# Patient Record
Sex: FEMALE | Race: WHITE | NOT HISPANIC OR LATINO | Employment: FULL TIME | URBAN - METROPOLITAN AREA
[De-identification: names, ages, dates, MRNs, and addresses within clinical notes are randomized per-mention and may not be internally consistent; named-entity substitution may affect disease eponyms.]

---

## 2017-12-12 ENCOUNTER — HOSPITAL ENCOUNTER (OUTPATIENT)
Dept: LAB | Facility: MEDICAL CENTER | Age: 42
End: 2017-12-12
Attending: OBSTETRICS & GYNECOLOGY
Payer: COMMERCIAL

## 2017-12-12 LAB — B-HCG SERPL-ACNC: 5473.9 MIU/ML (ref 0–5)

## 2017-12-12 PROCEDURE — 36415 COLL VENOUS BLD VENIPUNCTURE: CPT

## 2017-12-12 PROCEDURE — 84702 CHORIONIC GONADOTROPIN TEST: CPT

## 2017-12-14 ENCOUNTER — HOSPITAL ENCOUNTER (OUTPATIENT)
Dept: RADIOLOGY | Facility: MEDICAL CENTER | Age: 42
End: 2017-12-14
Attending: OBSTETRICS & GYNECOLOGY
Payer: COMMERCIAL

## 2017-12-14 ENCOUNTER — HOSPITAL ENCOUNTER (OUTPATIENT)
Dept: LAB | Facility: MEDICAL CENTER | Age: 42
End: 2017-12-14
Attending: OBSTETRICS & GYNECOLOGY
Payer: COMMERCIAL

## 2017-12-14 DIAGNOSIS — O00.109 TUBAL PREGNANCY WITHOUT INTRAUTERINE PREGNANCY, UNSPECIFIED LATERALITY: ICD-10-CM

## 2017-12-14 LAB — B-HCG SERPL-ACNC: 9008.7 MIU/ML (ref 0–5)

## 2017-12-14 PROCEDURE — 84702 CHORIONIC GONADOTROPIN TEST: CPT

## 2017-12-14 PROCEDURE — 36415 COLL VENOUS BLD VENIPUNCTURE: CPT

## 2017-12-14 PROCEDURE — 76817 TRANSVAGINAL US OBSTETRIC: CPT

## 2017-12-16 ENCOUNTER — OFFICE VISIT (OUTPATIENT)
Dept: URGENT CARE | Facility: PHYSICIAN GROUP | Age: 42
End: 2017-12-16
Payer: COMMERCIAL

## 2017-12-16 ENCOUNTER — HOSPITAL ENCOUNTER (OUTPATIENT)
Facility: MEDICAL CENTER | Age: 42
End: 2017-12-16
Attending: FAMILY MEDICINE
Payer: COMMERCIAL

## 2017-12-16 VITALS
HEART RATE: 102 BPM | BODY MASS INDEX: 42.52 KG/M2 | HEIGHT: 63 IN | TEMPERATURE: 97.7 F | OXYGEN SATURATION: 97 % | DIASTOLIC BLOOD PRESSURE: 70 MMHG | RESPIRATION RATE: 14 BRPM | SYSTOLIC BLOOD PRESSURE: 124 MMHG | WEIGHT: 240 LBS

## 2017-12-16 DIAGNOSIS — R39.15 URINARY URGENCY: ICD-10-CM

## 2017-12-16 LAB
APPEARANCE UR: ABNORMAL
BACTERIA #/AREA URNS HPF: ABNORMAL /HPF
BILIRUB UR QL STRIP.AUTO: NEGATIVE
COLOR UR: YELLOW
CULTURE IF INDICATED INDCX: YES UA CULTURE
EPI CELLS #/AREA URNS HPF: ABNORMAL /HPF
GLUCOSE UR STRIP.AUTO-MCNC: NEGATIVE MG/DL
HYALINE CASTS #/AREA URNS LPF: ABNORMAL /LPF
KETONES UR STRIP.AUTO-MCNC: NEGATIVE MG/DL
LEUKOCYTE ESTERASE UR QL STRIP.AUTO: ABNORMAL
MICRO URNS: ABNORMAL
NITRITE UR QL STRIP.AUTO: NEGATIVE
PH UR STRIP.AUTO: 5 [PH]
PROT UR QL STRIP: NEGATIVE MG/DL
RBC # URNS HPF: ABNORMAL /HPF
RBC UR QL AUTO: NEGATIVE
SP GR UR STRIP.AUTO: 1.03
UROBILINOGEN UR STRIP.AUTO-MCNC: 0.2 MG/DL
WBC #/AREA URNS HPF: ABNORMAL /HPF

## 2017-12-16 PROCEDURE — 81001 URINALYSIS AUTO W/SCOPE: CPT

## 2017-12-16 PROCEDURE — 99203 OFFICE O/P NEW LOW 30 MIN: CPT | Performed by: FAMILY MEDICINE

## 2017-12-16 PROCEDURE — 87086 URINE CULTURE/COLONY COUNT: CPT

## 2017-12-16 RX ORDER — CEFDINIR 300 MG/1
300 CAPSULE ORAL EVERY 12 HOURS
Qty: 10 CAP | Refills: 0 | Status: SHIPPED | OUTPATIENT
Start: 2017-12-16 | End: 2017-12-21

## 2017-12-16 RX ORDER — ALPRAZOLAM 0.25 MG/1
0.25 TABLET ORAL PRN
COMMUNITY
Start: 2017-10-05 | End: 2021-03-05

## 2017-12-16 RX ORDER — MINOCYCLINE HYDROCHLORIDE 100 MG/1
100 CAPSULE ORAL DAILY
COMMUNITY
Start: 2017-10-29 | End: 2020-08-12

## 2017-12-16 RX ORDER — CITALOPRAM 20 MG/1
20 TABLET ORAL DAILY
COMMUNITY
Start: 2017-11-20 | End: 2023-02-24

## 2017-12-16 RX ORDER — RANITIDINE 150 MG/1
150 TABLET ORAL 2 TIMES DAILY
COMMUNITY
Start: 2017-10-29 | End: 2020-08-12

## 2017-12-16 RX ORDER — MONTELUKAST SODIUM 4 MG/1
TABLET, CHEWABLE ORAL
COMMUNITY
Start: 2017-10-05 | End: 2018-03-02

## 2017-12-16 ASSESSMENT — ENCOUNTER SYMPTOMS
SORE THROAT: 1
PALPITATIONS: 0
CHILLS: 0
COUGH: 1
FEVER: 1
EYE DISCHARGE: 0
HEADACHES: 0
FLANK PAIN: 0
VOMITING: 0
NAUSEA: 0
DIARRHEA: 0
SPUTUM PRODUCTION: 0
MYALGIAS: 0
EYE REDNESS: 0
SINUS PAIN: 1
WHEEZING: 0
ABDOMINAL PAIN: 0

## 2017-12-16 ASSESSMENT — PAIN SCALES - GENERAL: PAINLEVEL: NO PAIN

## 2017-12-16 NOTE — PATIENT INSTRUCTIONS
Urinary Tract Infection  A urinary tract infection (UTI) can occur any place along the urinary tract. The tract includes the kidneys, ureters, bladder, and urethra. A type of germ called bacteria often causes a UTI. UTIs are often helped with antibiotic medicine.   HOME CARE   · If given, take antibiotics as told by your doctor. Finish them even if you start to feel better.  · Drink enough fluids to keep your pee (urine) clear or pale yellow.  · Avoid tea, drinks with caffeine, and bubbly (carbonated) drinks.  · Pee often. Avoid holding your pee in for a long time.  · Pee before and after having sex (intercourse).  · Wipe from front to back after you poop (bowel movement) if you are a woman. Use each tissue only once.  GET HELP RIGHT AWAY IF:   · You have back pain.  · You have lower belly (abdominal) pain.  · You have chills.  · You feel sick to your stomach (nauseous).  · You throw up (vomit).  · Your burning or discomfort with peeing does not go away.  · You have a fever.  · Your symptoms are not better in 3 days.  MAKE SURE YOU:   · Understand these instructions.  · Will watch your condition.  · Will get help right away if you are not doing well or get worse.     This information is not intended to replace advice given to you by your health care provider. Make sure you discuss any questions you have with your health care provider.     Document Released: 06/05/2009 Document Revised: 01/08/2016 Document Reviewed: 07/18/2013  [employer] Interactive Patient Education ©2016 [employer]

## 2017-12-16 NOTE — PROGRESS NOTES
Subjective:      Bela Gonzalez is a 41 y.o. female who presents with No chief complaint on file.            No chief complaint on file.      HISTORY OF PRESENT ILLNESS: Patient is a 41 y.o. female who presents today due to complaints of a sore throat for the past 4 days. Reports associated fever, pain with swallowing congestion and runny nose. Pain is currently rated 5/10. Denies associated  difficulty breathing. They have tried OTC medications at home without much improvement. She has  known ill contacts.       She also complains of bladder cramps dn increased frequncyShe has had symptoms for 2 days .Denies urgency and dysuria.  Patient denies stomachache, back pain, vaginal discharge. Patient does not have a history of recurrent UTI.  Patient does not have a history of pyelonephritis. She is about 6 weeks pregnant.       Patient Active Problem List    Palpitations         Priority: Medium (2)         Date Noted: 06/04/2013      Inappropriate sinus tachycardia         Priority: Medium (2)         Date Noted: 03/07/2012      Overweight         Priority: Medium (2)         Date Noted: 03/07/2012      Other specified disorder of gallbladder         Priority: Low (3)         Date Noted: 01/15/2015      Elevated glucose         Priority: Low (3)         Date Noted: 09/19/2014      FHx: migraine headaches         Priority: Low (3)         Date Noted: 03/07/2012      Methamphetamine use, remote         Priority: Low (3)         Date Noted: 03/07/2012        Allergies:Patient has no known allergies.    Current Outpatient Prescriptions Ordered in Epic:  Prenatal Vit-Fe Fumarate-FA (PRENATAL VITAMIN PO), Take 1 Tab by mouth every day., Disp: , Rfl:   metoprolol SR (TOPROL XL) 100 MG TB24, Take 1 Tab by mouth every bedtime., Disp: 90 Tab, Rfl: 3    No current [facility]-ordered facility-administered medications on file.       Past Medical History:  No date: Arrhythmia      Comment: tachycardia  cardiologist Geovanny Bellamy  No  date: Diabetes      Comment: gestational  No date: Elevated WBC count  No date: Heart burn  No date: Indigestion  No date: Migraine  01/07/15: Pain      Comment: Back=chronic(scoliosis)>4/10  No date: Scoliosis    Smoking status: Never Smoker                                                              Smokeless tobacco: Never Used                      Alcohol use: No                Relation  Status                        Comments  Mo        Alive                           Fa         at age 51            brain tumor  Sis       Alive                           Review of patient's family history indicates:    Diabetes                       Mother                    Asthma                         Sister                                    .             Review of Systems   Constitutional: Positive for fever. Negative for chills and malaise/fatigue.   HENT: Positive for congestion, sinus pain and sore throat.    Eyes: Negative for discharge and redness.   Respiratory: Positive for cough. Negative for sputum production and wheezing.    Cardiovascular: Negative for chest pain and palpitations.   Gastrointestinal: Negative for abdominal pain, diarrhea, nausea and vomiting.   Genitourinary: Positive for frequency. Negative for dysuria, flank pain and urgency.   Musculoskeletal: Negative for myalgias.   Skin: Negative for itching and rash.   Neurological: Negative for headaches.          Objective:     There were no vitals taken for this visit.     Physical Exam   Constitutional: She is oriented to person, place, and time. She appears well-developed and well-nourished.   HENT:   Head: Normocephalic.   Right Ear: External ear normal.   Left Ear: External ear normal.   Mouth/Throat: Oropharyngeal exudate present. Tonsils are 2+ on the right. Tonsils are 2+ on the left.       Eyes: EOM are normal. Pupils are equal, round, and reactive to light. Right eye exhibits no discharge. Left eye exhibits no discharge.   Neck: Normal  range of motion. Neck supple.   Cardiovascular: Normal rate, regular rhythm and normal heart sounds.    Pulmonary/Chest: Effort normal and breath sounds normal. No respiratory distress.   Abdominal: Soft. Bowel sounds are normal. She exhibits no distension.   Lymphadenopathy:     She has no cervical adenopathy.   Neurological: She is alert and oriented to person, place, and time.   Skin: Skin is warm.   Psychiatric: She has a normal mood and affect.               Assessment/Plan:     Assessment/Plan:  No diagnosis found.      POC strep + strep      Antibiotic as directed. omnicef used to treat both strep and possible UTI. OTC motrin or tylenol for pain/fever control. Hand hygiene. Increase fluid intake, rest. Warm salt water gargles.   Supportive care, differential diagnoses, and indications for immediate follow-up discussed with patient.       Urine dipstick: negative for glucose, negative for hemoglobin, negative for ketones, 2+ for leukocyte esterase, negative for nitrites, trace for protein.         Assessment:    Acute cystitis      Plan:    Medications: omnicef , UA and Ucx.  Maintain adequate hydration.  Follow up if symptoms not improving, and prn.        Pathogenesis of diagnosis discussed including typical length and natural progression.   Instructed to return to clinic or nearest emergency department for any change in condition, further concerns, or worsening of symptoms.  Patient states understanding of the plan of care and discharge instructions.  Instructed to make an appointment, for follow up, with their primary care provider.

## 2017-12-18 LAB
BACTERIA UR CULT: NORMAL
SIGNIFICANT IND 70042: NORMAL
SITE SITE: NORMAL
SOURCE SOURCE: NORMAL

## 2017-12-27 ENCOUNTER — OFFICE VISIT (OUTPATIENT)
Dept: URGENT CARE | Facility: PHYSICIAN GROUP | Age: 42
End: 2017-12-27
Payer: COMMERCIAL

## 2017-12-27 VITALS
WEIGHT: 240 LBS | TEMPERATURE: 99.1 F | OXYGEN SATURATION: 97 % | DIASTOLIC BLOOD PRESSURE: 72 MMHG | RESPIRATION RATE: 14 BRPM | BODY MASS INDEX: 42.51 KG/M2 | HEART RATE: 101 BPM | SYSTOLIC BLOOD PRESSURE: 110 MMHG

## 2017-12-27 DIAGNOSIS — J02.0 STREP PHARYNGITIS: ICD-10-CM

## 2017-12-27 DIAGNOSIS — J02.9 SORE THROAT: ICD-10-CM

## 2017-12-27 LAB
INT CON NEG: NEGATIVE
INT CON POS: POSITIVE
S PYO AG THROAT QL: POSITIVE

## 2017-12-27 PROCEDURE — 99214 OFFICE O/P EST MOD 30 MIN: CPT | Performed by: NURSE PRACTITIONER

## 2017-12-27 PROCEDURE — 87880 STREP A ASSAY W/OPTIC: CPT | Performed by: NURSE PRACTITIONER

## 2017-12-27 RX ORDER — AMOXICILLIN 500 MG/1
500 CAPSULE ORAL 2 TIMES DAILY
Qty: 20 CAP | Refills: 0 | Status: SHIPPED | OUTPATIENT
Start: 2017-12-27 | End: 2018-01-06

## 2017-12-27 RX ORDER — AZITHROMYCIN 250 MG/1
TABLET, FILM COATED ORAL
Qty: 6 TAB | Refills: 0 | Status: CANCELLED | OUTPATIENT
Start: 2017-12-27

## 2017-12-27 ASSESSMENT — ENCOUNTER SYMPTOMS
CONSTIPATION: 0
WEAKNESS: 0
SHORTNESS OF BREATH: 0
EYE REDNESS: 0
ORTHOPNEA: 0
VOMITING: 0
DIARRHEA: 0
FEVER: 1
ABDOMINAL PAIN: 0
EYE DISCHARGE: 0
SPUTUM PRODUCTION: 0
SORE THROAT: 1
PALPITATIONS: 0
CHILLS: 0
DIZZINESS: 0
WHEEZING: 0
COUGH: 1
NAUSEA: 0
NECK PAIN: 0
MYALGIAS: 0
HEADACHES: 0

## 2017-12-27 NOTE — PROGRESS NOTES
Subjective:      Bela Gonzalez is a 42 y.o. female who presents with Pharyngitis (finshed med for strep x5 days ago, sore throat came back x2 days ago)            HPI  C/o sore throat, treated for strep/UTI 5 days ago. Sore throat again 2 day ago. Nasal congestion. Humidifier and tylenol. Cough drops, intermittent cough without SOB, chest tightness or wheeze. Admits to not changing tooth brush. Currently pregnant.    PMH:  has a past medical history of Arrhythmia; Diabetes; Elevated WBC count; Heart burn; Indigestion; Migraine; Pain (01/07/15); and Scoliosis. She also has no past medical history of PSVT (paroxysmal supraventricular tachycardia) (CMS-[facility]).  MEDS:   Current Outpatient Prescriptions:   •  citalopram (CELEXA) 20 MG Tab, , Disp: , Rfl:   •  ranitidine (ZANTAC) 150 MG Tab, , Disp: , Rfl:   •  Prenatal Vit-Fe Fumarate-FA (PRENATAL VITAMIN PO), Take 1 Tab by mouth every day., Disp: , Rfl:   •  metoprolol SR (TOPROL XL) 100 MG TB24, Take 1 Tab by mouth every bedtime., Disp: 90 Tab, Rfl: 3  •  alprazolam (XANAX) 0.25 MG Tab, , Disp: , Rfl:   •  colestipol (COLESTID) 1 GM Tab, , Disp: , Rfl:   •  minocycline (MINOCIN) 100 MG Cap, , Disp: , Rfl:   ALLERGIES: No Known Allergies  SURGHX:   Past Surgical History:   Procedure Laterality Date   • BETH BY LAPAROSCOPY N/A 1/15/2015    Procedure: BETH BY LAPAROSCOPY;  Surgeon: Hima Joseph M.D.;  Location: SURGERY [facility];  Service:    • RECOVERY  5/23/2012    Performed by SURGERY, IR-RECOVERY at SURGERY SAME DAY [facility]   • OTHER      wisdom teeth     SOCHX:  reports that she has never smoked. She has never used smokeless tobacco. She reports that she does not drink alcohol or use drugs.  FH: Family history was reviewed, no pertinent findings to report    Review of Systems   Constitutional: Positive for fever and malaise/fatigue. Negative for chills.   HENT: Positive for congestion and sore throat. Negative for ear pain.    Eyes: Negative for  discharge and redness.   Respiratory: Positive for cough. Negative for sputum production, shortness of breath and wheezing.    Cardiovascular: Negative for chest pain, palpitations and orthopnea.   Gastrointestinal: Negative for abdominal pain, constipation, diarrhea, nausea and vomiting.   Musculoskeletal: Negative for myalgias and neck pain.   Neurological: Negative for dizziness, weakness and headaches.   All other systems reviewed and are negative.         Objective:     /72   Pulse (!) 101   Temp 37.3 °C (99.1 °F)   Resp 14   Wt 108.9 kg (240 lb)   SpO2 97%   Breastfeeding? No   BMI 42.51 kg/m²      Physical Exam   Constitutional: She is oriented to person, place, and time. She appears well-developed and well-nourished. She is active and cooperative.  Non-toxic appearance. She does not have a sickly appearance. She appears ill. No distress.   HENT:   Head: Normocephalic.   Right Ear: Tympanic membrane, external ear and ear canal normal.   Left Ear: External ear and ear canal normal. Tympanic membrane is injected.   Nose: Mucosal edema and rhinorrhea present. No sinus tenderness.   Mouth/Throat: Uvula is midline. Mucous membranes are dry. No uvula swelling. Posterior oropharyngeal erythema present. No posterior oropharyngeal edema.   Eyes: Conjunctivae and EOM are normal. Pupils are equal, round, and reactive to light.   Neck: Normal range of motion. Neck supple.   Cardiovascular: Normal rate and regular rhythm.    Pulmonary/Chest: Effort normal and breath sounds normal. No accessory muscle usage. No respiratory distress. She has no decreased breath sounds. She has no wheezes. She has no rhonchi. She has no rales.   Musculoskeletal: Normal range of motion.   Lymphadenopathy:     She has no cervical adenopathy.   Neurological: She is alert and oriented to person, place, and time.   Skin: Skin is warm and dry. She is not diaphoretic.   Vitals reviewed.              Assessment/Plan:     1. Sore  throat    - POCT Rapid Strep A    2. Strep pharyngitis    - amoxicillin (AMOXIL) 500 MG Cap; Take 1 Cap by mouth 2 times a day for 10 days.  Dispense: 20 Cap; Refill: 0      Increase water intake  May use Tylenol prn for any fever, body aches or throat pain  May use saline nasal spray/gifty pot for nasal congestion prn  May use throat lozenges for throat discomfort  May gargle with salt water prn for throat discomfort  May drink smoothies for nutrition if too painful to swallow solid foods  Monitor for continued fever with treatment, any sinus pain/pressure with sinus congestion with thick mucus production and HA- need re-evaluation  Monitor for productive cough, SOB and chest pain/tightness, fever- need re-evaluation

## 2017-12-27 NOTE — LETTER
December 27, 2017       Patient: Bela Gonzalez   YOB: 1975   Date of Visit: 12/27/2017         To Whom It May Concern:    It is my medical opinion that Bela Gonzalez be excused from work today due to illness.    If you have any questions or concerns, please don't hesitate to call [phone]          Sincerely,          KEREN Hirsch.N.P.  Electronically Signed

## 2018-02-15 ENCOUNTER — TELEPHONE (OUTPATIENT)
Dept: CARDIOLOGY | Facility: MEDICAL CENTER | Age: 43
End: 2018-02-15

## 2018-02-15 DIAGNOSIS — R00.2 PALPITATIONS: ICD-10-CM

## 2018-02-15 RX ORDER — METOPROLOL SUCCINATE 50 MG/1
50 TABLET, EXTENDED RELEASE ORAL
Qty: 90 TAB | Refills: 0 | OUTPATIENT
Start: 2018-02-15 | End: 2018-05-07 | Stop reason: SDUPTHER

## 2018-02-15 NOTE — TELEPHONE ENCOUNTER
I haven't seen her in two years. Would like to see her soon, but okay to cut back on metoprolol to 50 mg and FU with our office soon. SC

## 2018-02-15 NOTE — TELEPHONE ENCOUNTER
patient wants her medications adjusted   Received: Today   Message Contents   ZABRINA Wilkins/Agustin       Patient is asking for an adjustment to her medications, she said it's been over a year since she was seen, and she wants to know if she needs to make an appt before her medications can be adjusted. She can be reached at [phone].        Pt states she has been taking Metoprolol tartrate 100mg Qevening and now thinks the prescription is too strong.   HR 50's, lowest was recorded as 46 on her fit bit. She feels lightheaded at times and dizzy if standing up too quickly.    She would like to try half a dose. She understands she is due for a follow-up and will call soon to make an appointment.     To SC

## 2018-03-02 ENCOUNTER — OFFICE VISIT (OUTPATIENT)
Dept: CARDIOLOGY | Facility: MEDICAL CENTER | Age: 43
End: 2018-03-02
Payer: COMMERCIAL

## 2018-03-02 VITALS
SYSTOLIC BLOOD PRESSURE: 114 MMHG | HEIGHT: 63 IN | WEIGHT: 224 LBS | OXYGEN SATURATION: 99 % | HEART RATE: 70 BPM | BODY MASS INDEX: 39.69 KG/M2 | RESPIRATION RATE: 14 BRPM | DIASTOLIC BLOOD PRESSURE: 78 MMHG

## 2018-03-02 DIAGNOSIS — R00.2 PALPITATIONS: ICD-10-CM

## 2018-03-02 DIAGNOSIS — E66.3 OVERWEIGHT: ICD-10-CM

## 2018-03-02 DIAGNOSIS — I47.11 INAPPROPRIATE SINUS TACHYCARDIA: ICD-10-CM

## 2018-03-02 DIAGNOSIS — R73.09 ELEVATED GLUCOSE: ICD-10-CM

## 2018-03-02 PROCEDURE — 99214 OFFICE O/P EST MOD 30 MIN: CPT | Performed by: NURSE PRACTITIONER

## 2018-03-02 ASSESSMENT — ENCOUNTER SYMPTOMS
CLAUDICATION: 0
COUGH: 0
NERVOUS/ANXIOUS: 0
SHORTNESS OF BREATH: 0
MYALGIAS: 0
PALPITATIONS: 1
ORTHOPNEA: 0
FEVER: 0
PND: 0
ABDOMINAL PAIN: 0

## 2018-03-02 NOTE — PROGRESS NOTES
Subjective:   Bela Gonzalez is a 39 y.o. female who presents today for yearly follow up.    She is a previous patient of Dr. Argueta in our office. Hx of palpitations with sinus tachycardia, gestational diabetes, and obesity.    She had two miscarriages since I saw her. Every time, she is pregnant, her palpitations worsen alongside elevated HR in the 120's at rest.    Now not being pregnant and with starting a new exercise program and losing 16 lbs in the last three months, her HR's are going lower in the 40-50's. We reduced her metoprolol dosing from 100 mg to 50 mg QD and this improved this some, but she is still worried about low HR's.    She has had no episodes of chest pain, dizziness/lightheadedness, orthopnea, or peripheral edema.    Past Medical History:   Diagnosis Date   • Diabetes     gestational   • Elevated WBC count    • Heart burn    • Inappropriate sinus node tachycardia    • Indigestion    • Migraine    • Pain 01/07/15    Back=chronic(scoliosis)>4/10   • Scoliosis      Past Surgical History:   Procedure Laterality Date   • BETH BY LAPAROSCOPY N/A 1/15/2015    Procedure: BETH BY LAPAROSCOPY;  Surgeon: Hima Joseph M.D.;  Location: SURGERY [facility];  Service:    • OTHER      wisdom teeth   • RECOVERY  5/23/2012    Performed by SURGERY, IR-RECOVERY at SURGERY SAME DAY [facility]     Family History   Problem Relation Age of Onset   • Diabetes Mother    • Asthma Sister      History   Smoking Status   • Never Smoker   Smokeless Tobacco   • Never Used     No Known Allergies  Outpatient Encounter Prescriptions as of 3/2/2018   Medication Sig Dispense Refill   • MULTIPLE VITAMIN PO Take 1 Tab by mouth every day.     • metoprolol SR (TOPROL XL) 50 MG TABLET SR 24 HR Take 1 Tab by mouth every bedtime. 90 Tab 0   • alprazolam (XANAX) 0.25 MG Tab Take 0.25 mg by mouth as needed. RARE USE     • citalopram (CELEXA) 20 MG Tab Take 20 mg by mouth every day.     • minocycline (MINOCIN) 100 MG  "Cap Take 100 mg by mouth every day.     • ranitidine (ZANTAC) 150 MG Tab Take 150 mg by mouth 2 times a day.     • [DISCONTINUED] colestipol (COLESTID) 1 GM Tab      • [DISCONTINUED] Prenatal Vit-Fe Fumarate-FA (PRENATAL VITAMIN PO) Take 1 Tab by mouth every day.       No facility-administered encounter medications on file as of 3/2/2018.      Review of Systems   Constitutional: Negative for fever and malaise/fatigue.   Respiratory: Negative for cough and shortness of breath.    Cardiovascular: Positive for palpitations. Negative for chest pain, orthopnea, claudication, leg swelling and PND.   Gastrointestinal: Negative for abdominal pain.   Musculoskeletal: Negative for myalgias.   Psychiatric/Behavioral: The patient is not nervous/anxious.    All other systems reviewed and are negative.       Objective:   /78   Pulse 70   Resp 14   Ht 1.6 m (5' 3\")   Wt 101.6 kg (224 lb)   SpO2 99%   BMI 39.68 kg/m²     Physical Exam   Constitutional: She is oriented to person, place, and time. She appears well-developed and well-nourished. No distress.   obese     HENT:   Head: Normocephalic and atraumatic.   Eyes: EOM are normal.   Neck: Normal range of motion. No JVD present.   Cardiovascular: Normal rate, regular rhythm, normal heart sounds and intact distal pulses.    No murmur heard.  Pulmonary/Chest: Effort normal and breath sounds normal. No respiratory distress.   Abdominal: Soft. Bowel sounds are normal.   Musculoskeletal: Normal range of motion. She exhibits no edema.   Neurological: She is alert and oriented to person, place, and time.   Skin: Skin is warm and dry.   Psychiatric: She has a normal mood and affect.   Nursing note and vitals reviewed.    Reviewed with patient  Lab Results   Component Value Date/Time    CHOLSTRLTOT 176 08/19/2016 07:11 AM     (H) 08/19/2016 07:11 AM    HDL 56 08/19/2016 07:11 AM    TRIGLYCERIDE 102 08/19/2016 07:11 AM      LABS WITH OB IN 2 WEEKS    2010 ECHO " WNL  Assessment:     1. Inappropriate sinus tachycardia  THYROID PANEL WITH TSH   2. Overweight  LIPID PANEL    COMP METABOLIC PANEL    CBC WITHOUT DIFFERENTIAL    THYROID PANEL WITH TSH   3. Palpitations  THYROID PANEL WITH TSH   4. Elevated glucose  THYROID PANEL WITH TSH     Medical Decision Making:  Today's Assessment / Status / Plan:     1. Palpitations from inappropriate sinus tachycardia  -cont reduced dose of metoprolol at 50 mg XL QPM  -call in 1-2 weeks if still having symptomatic bradycardia at rest  -reduce dose further if warranted with symptoms to 25 mg XL QPM     2. Overweight  -lifestyle modifications  -congratulated her on weight loss and healthy lifestyle choices  -discussed aerobic HR levels    Yearly labs ordered, recommend PCP follow up yearly as well for wellness check.    FU in clinic in 12 months with SC    Patient verbalizes understanding and agrees with the plan of care.     Collaborating MD: no ADD today.

## 2018-03-02 NOTE — LETTER
[facility]   [address], [address]  [place] [address]  Phone: [phone]  Fax: [phone]              Bela Gonzalez  1975    Encounter Date: 3/2/2018    Bela CLARITA Woody          PROGRESS NOTE:  Subjective:   Bela Gonzalez is a 39 y.o. female who presents today for yearly follow up.    She is a previous patient of Dr. Argueta in our office. Hx of palpitations with sinus tachycardia, gestational diabetes, and obesity.    She had two miscarriages since I saw her. Every time, she is pregnant, her palpitations worsen alongside elevated HR in the 120's at rest.    Now not being pregnant and with starting a new exercise program and losing 16 lbs in the last three months, her HR's are going lower in the 40-50's. We reduced her metoprolol dosing from 100 mg to 50 mg QD and this improved this some, but she is still worried about low HR's.    She has had no episodes of chest pain, dizziness/lightheadedness, orthopnea, or peripheral edema.    Past Medical History:   Diagnosis Date   • Diabetes     gestational   • Elevated WBC count    • Heart burn    • Inappropriate sinus node tachycardia    • Indigestion    • Migraine    • Pain 01/07/15    Back=chronic(scoliosis)>4/10   • Scoliosis      Past Surgical History:   Procedure Laterality Date   • BETH BY LAPAROSCOPY N/A 1/15/2015    Procedure: BETH BY LAPAROSCOPY;  Surgeon: Hima Joseph M.D.;  Location: SURGERY [facility];  Service:    • OTHER      wisdom teeth   • RECOVERY  5/23/2012    Performed by SURGERY, IR-RECOVERY at SURGERY SAME DAY [facility]     Family History   Problem Relation Age of Onset   • Diabetes Mother    • Asthma Sister      History   Smoking Status   • Never Smoker   Smokeless Tobacco   • Never Used     No Known Allergies  Outpatient Encounter Prescriptions as of 3/2/2018   Medication Sig Dispense Refill   • MULTIPLE VITAMIN PO Take 1 Tab by mouth every day.    "  • metoprolol SR (TOPROL XL) 50 MG TABLET SR 24 HR Take 1 Tab by mouth every bedtime. 90 Tab 0   • alprazolam (XANAX) 0.25 MG Tab Take 0.25 mg by mouth as needed. RARE USE     • citalopram (CELEXA) 20 MG Tab Take 20 mg by mouth every day.     • minocycline (MINOCIN) 100 MG Cap Take 100 mg by mouth every day.     • ranitidine (ZANTAC) 150 MG Tab Take 150 mg by mouth 2 times a day.     • [DISCONTINUED] colestipol (COLESTID) 1 GM Tab      • [DISCONTINUED] Prenatal Vit-Fe Fumarate-FA (PRENATAL VITAMIN PO) Take 1 Tab by mouth every day.       No facility-administered encounter medications on file as of 3/2/2018.      Review of Systems   Constitutional: Negative for fever and malaise/fatigue.   Respiratory: Negative for cough and shortness of breath.    Cardiovascular: Positive for palpitations. Negative for chest pain, orthopnea, claudication, leg swelling and PND.   Gastrointestinal: Negative for abdominal pain.   Musculoskeletal: Negative for myalgias.   Psychiatric/Behavioral: The patient is not nervous/anxious.    All other systems reviewed and are negative.       Objective:   /78   Pulse 70   Resp 14   Ht 1.6 m (5' 3\")   Wt 101.6 kg (224 lb)   SpO2 99%   BMI 39.68 kg/m²      Physical Exam   Constitutional: She is oriented to person, place, and time. She appears well-developed and well-nourished. No distress.   obese     HENT:   Head: Normocephalic and atraumatic.   Eyes: EOM are normal.   Neck: Normal range of motion. No JVD present.   Cardiovascular: Normal rate, regular rhythm, normal heart sounds and intact distal pulses.    No murmur heard.  Pulmonary/Chest: Effort normal and breath sounds normal. No respiratory distress.   Abdominal: Soft. Bowel sounds are normal.   Musculoskeletal: Normal range of motion. She exhibits no edema.   Neurological: She is alert and oriented to person, place, and time.   Skin: Skin is warm and dry.   Psychiatric: She has a normal mood and affect.   Nursing note and " vitals reviewed.    Reviewed with patient  Lab Results   Component Value Date/Time    CHOLSTRLTOT 176 08/19/2016 07:11 AM     (H) 08/19/2016 07:11 AM    HDL 56 08/19/2016 07:11 AM    TRIGLYCERIDE 102 08/19/2016 07:11 AM      LABS WITH OB IN 2 WEEKS    2010 ECHO WNL  Assessment:     1. Inappropriate sinus tachycardia  THYROID PANEL WITH TSH   2. Overweight  LIPID PANEL    COMP METABOLIC PANEL    CBC WITHOUT DIFFERENTIAL    THYROID PANEL WITH TSH   3. Palpitations  THYROID PANEL WITH TSH   4. Elevated glucose  THYROID PANEL WITH TSH     Medical Decision Making:  Today's Assessment / Status / Plan:     1. Palpitations from inappropriate sinus tachycardia  -cont reduced dose of metoprolol at 50 mg XL QPM  -call in 1-2 weeks if still having symptomatic bradycardia at rest  -reduce dose further if warranted with symptoms to 25 mg XL QPM     2. Overweight  -lifestyle modifications  -congratulated her on weight loss and healthy lifestyle choices  -discussed aerobic HR levels    Yearly labs ordered, recommend PCP follow up yearly as well for wellness check.    FU in clinic in 12 months with SC    Patient verbalizes understanding and agrees with the plan of care.     Collaborating MD: no ADD today.        No Recipients

## 2018-04-20 ENCOUNTER — HOSPITAL ENCOUNTER (OUTPATIENT)
Dept: RADIOLOGY | Facility: MEDICAL CENTER | Age: 43
End: 2018-04-20
Attending: SURGERY
Payer: COMMERCIAL

## 2018-04-20 DIAGNOSIS — E04.1 LEFT THYROID NODULE: ICD-10-CM

## 2018-04-20 PROCEDURE — 76536 US EXAM OF HEAD AND NECK: CPT

## 2018-04-23 ENCOUNTER — APPOINTMENT (OUTPATIENT)
Dept: RADIOLOGY | Facility: MEDICAL CENTER | Age: 43
End: 2018-04-23
Attending: SURGERY
Payer: COMMERCIAL

## 2018-05-07 DIAGNOSIS — R00.2 PALPITATIONS: ICD-10-CM

## 2018-05-08 RX ORDER — METOPROLOL SUCCINATE 50 MG/1
TABLET, EXTENDED RELEASE ORAL
Qty: 90 TAB | Refills: 3 | Status: SHIPPED | OUTPATIENT
Start: 2018-05-08 | End: 2019-05-05 | Stop reason: SDUPTHER

## 2018-09-02 ENCOUNTER — OFFICE VISIT (OUTPATIENT)
Dept: URGENT CARE | Facility: PHYSICIAN GROUP | Age: 43
End: 2018-09-02
Payer: COMMERCIAL

## 2018-09-02 VITALS
HEIGHT: 63 IN | BODY MASS INDEX: 34.91 KG/M2 | TEMPERATURE: 98.3 F | HEART RATE: 85 BPM | WEIGHT: 197 LBS | SYSTOLIC BLOOD PRESSURE: 118 MMHG | RESPIRATION RATE: 15 BRPM | OXYGEN SATURATION: 99 % | DIASTOLIC BLOOD PRESSURE: 70 MMHG

## 2018-09-02 DIAGNOSIS — J01.00 ACUTE NON-RECURRENT MAXILLARY SINUSITIS: ICD-10-CM

## 2018-09-02 PROCEDURE — 99214 OFFICE O/P EST MOD 30 MIN: CPT | Performed by: PHYSICIAN ASSISTANT

## 2018-09-02 RX ORDER — AMOXICILLIN AND CLAVULANATE POTASSIUM 875; 125 MG/1; MG/1
1 TABLET, FILM COATED ORAL 2 TIMES DAILY
Qty: 14 TAB | Refills: 0 | Status: SHIPPED | OUTPATIENT
Start: 2018-09-02 | End: 2018-09-09

## 2018-09-02 ASSESSMENT — ENCOUNTER SYMPTOMS
SINUS PAIN: 1
SHORTNESS OF BREATH: 0
WHEEZING: 0
EYE DISCHARGE: 0
FEVER: 0
COUGH: 0
EYE REDNESS: 0
SINUS PRESSURE: 1
CHILLS: 0
DIZZINESS: 1
SORE THROAT: 0
HEADACHES: 1
PALPITATIONS: 0
EYE PAIN: 0

## 2018-09-02 NOTE — PROGRESS NOTES
Subjective:      Bela Gonzalez is a 42 y.o. female who presents with Nasal Congestion (x 1 week pressure in sinus)            Sinus Problem   This is a new problem. The current episode started 1 to 4 weeks ago. The problem is unchanged. Associated symptoms include congestion, ear pain, headaches and sinus pressure. Pertinent negatives include no chills, coughing, shortness of breath or sore throat. Past treatments include oral decongestants. The treatment provided no relief.       Review of Systems   Constitutional: Positive for malaise/fatigue. Negative for chills and fever.   HENT: Positive for congestion, ear pain, sinus pain and sinus pressure. Negative for sore throat.    Eyes: Negative for pain, discharge and redness.   Respiratory: Negative for cough, shortness of breath and wheezing.    Cardiovascular: Negative for chest pain and palpitations.   Neurological: Positive for dizziness and headaches.   All other systems reviewed and are negative.    PMH:  has a past medical history of Diabetes; Elevated WBC count; Heart burn; Inappropriate sinus node tachycardia; Indigestion; Migraine; Pain (01/07/15); and Scoliosis.  MEDS:   Current Outpatient Prescriptions:   •  amoxicillin-clavulanate (AUGMENTIN) 875-125 MG Tab, Take 1 Tab by mouth 2 times a day for 7 days., Disp: 14 Tab, Rfl: 0  •  metoprolol SR (TOPROL XL) 50 MG TABLET SR 24 HR, TAKE ONE TABLET BY MOUTH EVERY NIGHT AT BEDTIME, Disp: 90 Tab, Rfl: 3  •  MULTIPLE VITAMIN PO, Take 1 Tab by mouth every day., Disp: , Rfl:   •  citalopram (CELEXA) 20 MG Tab, Take 20 mg by mouth every day., Disp: , Rfl:   •  minocycline (MINOCIN) 100 MG Cap, Take 100 mg by mouth every day., Disp: , Rfl:   •  ranitidine (ZANTAC) 150 MG Tab, Take 150 mg by mouth 2 times a day., Disp: , Rfl:   •  alprazolam (XANAX) 0.25 MG Tab, Take 0.25 mg by mouth as needed. RARE USE, Disp: , Rfl:   ALLERGIES: No Known Allergies  SURGHX:   Past Surgical History:   Procedure Laterality Date  "  • BETH BY LAPAROSCOPY N/A 1/15/2015    Procedure: BETH BY LAPAROSCOPY;  Surgeon: Hima Joseph M.D.;  Location: SURGERY [facility];  Service:    • RECOVERY  5/23/2012    Performed by SURGERY, IR-RECOVERY at SURGERY SAME DAY [facility] ORS   • OTHER      wisdom teeth     SOCHX:  reports that she has never smoked. She has never used smokeless tobacco. She reports that she does not drink alcohol or use drugs.  FH: Family history was reviewed, no pertinent findings to report  Medications, Allergies, and current problem list reviewed today in Epic       Objective:     /70   Pulse 85   Temp 36.8 °C (98.3 °F)   Resp 15   Ht 1.6 m (5' 3\")   Wt 89.4 kg (197 lb)   SpO2 99%   BMI 34.90 kg/m²      Physical Exam   Constitutional: She is oriented to person, place, and time. She appears well-developed and well-nourished.  Non-toxic appearance. She does not have a sickly appearance. She does not appear ill. No distress.   HENT:   Head: Normocephalic and atraumatic.   Right Ear: Hearing, tympanic membrane, external ear and ear canal normal.   Left Ear: Hearing, tympanic membrane, external ear and ear canal normal.   Nose: Mucosal edema and rhinorrhea present. No sinus tenderness. No epistaxis. Right sinus exhibits maxillary sinus tenderness. Left sinus exhibits maxillary sinus tenderness.   Mouth/Throat: Uvula is midline, oropharynx is clear and moist and mucous membranes are normal.   Eyes: Conjunctivae and EOM are normal.   Neck: Normal range of motion. Neck supple.   Cardiovascular: Normal rate, regular rhythm, normal heart sounds and intact distal pulses.    Pulmonary/Chest: Effort normal and breath sounds normal.   Neurological: She is alert and oriented to person, place, and time.   Skin: Skin is warm and dry.   Psychiatric: She has a normal mood and affect. Her behavior is normal. Judgment and thought content normal.   Vitals reviewed.              Assessment/Plan:     1. Acute non-recurrent maxillary " sinusitis    - amoxicillin-clavulanate (AUGMENTIN) 875-125 MG Tab; Take 1 Tab by mouth 2 times a day for 7 days.  Dispense: 14 Tab; Refill: 0    Differential diagnosis, natural history, supportive care discussed. Follow-up with primary care provider within 7-10 days, emergency room precautions discussed.  Patient and/or family appears understanding of information.  Handout and review of patients diagnosis and treatment was discussed extensively.

## 2019-01-07 ENCOUNTER — OFFICE VISIT (OUTPATIENT)
Dept: URGENT CARE | Facility: PHYSICIAN GROUP | Age: 44
End: 2019-01-07
Payer: COMMERCIAL

## 2019-01-07 VITALS
HEIGHT: 63 IN | OXYGEN SATURATION: 100 % | SYSTOLIC BLOOD PRESSURE: 112 MMHG | DIASTOLIC BLOOD PRESSURE: 74 MMHG | HEART RATE: 92 BPM | TEMPERATURE: 98.8 F | BODY MASS INDEX: 36.32 KG/M2 | WEIGHT: 205 LBS

## 2019-01-07 DIAGNOSIS — S39.012A LUMBOSACRAL STRAIN, INITIAL ENCOUNTER: Primary | ICD-10-CM

## 2019-01-07 DIAGNOSIS — M54.32 SCIATICA OF LEFT SIDE: ICD-10-CM

## 2019-01-07 PROCEDURE — 99214 OFFICE O/P EST MOD 30 MIN: CPT | Performed by: PHYSICIAN ASSISTANT

## 2019-01-07 RX ORDER — TOPIRAMATE 25 MG/1
TABLET ORAL
COMMUNITY
Start: 2018-12-14 | End: 2020-08-12

## 2019-01-07 RX ORDER — CYCLOBENZAPRINE HCL 10 MG
10 TABLET ORAL 3 TIMES DAILY PRN
Qty: 21 TAB | Refills: 0 | Status: SHIPPED | OUTPATIENT
Start: 2019-01-07 | End: 2019-01-14

## 2019-01-07 ASSESSMENT — ENCOUNTER SYMPTOMS: BACK PAIN: 1

## 2019-01-07 NOTE — LETTER
January 7, 2019         Patient: Bela Gonzalez   YOB: 1975   Date of Visit: 1/7/2019           To Whom it May Concern:    Bela Gonzalez was seen in my clinic on 1/7/2019. She may return to work on 01/09/2019.    If you have any questions or concerns, please don't hesitate to call.        Sincerely,           Yarelis Isaac P.A.-C.  Electronically Signed

## 2019-01-07 NOTE — PROGRESS NOTES
Subjective:      Bela Gonzalez is a 43 y.o. female who presents with Back Pain (Low back pain )    PMH:  has a past medical history of Diabetes; Elevated WBC count; Heart burn; Inappropriate sinus node tachycardia; Indigestion; Migraine; Pain (01/07/15); and Scoliosis.  MEDS:   Current Outpatient Prescriptions:   •  topiramate (TOPAMAX) 25 MG Tab, , Disp: , Rfl:   •  metoprolol SR (TOPROL XL) 50 MG TABLET SR 24 HR, TAKE ONE TABLET BY MOUTH EVERY NIGHT AT BEDTIME, Disp: 90 Tab, Rfl: 3  •  MULTIPLE VITAMIN PO, Take 1 Tab by mouth every day., Disp: , Rfl:   •  citalopram (CELEXA) 20 MG Tab, Take 20 mg by mouth every day., Disp: , Rfl:   •  minocycline (MINOCIN) 100 MG Cap, Take 100 mg by mouth every day., Disp: , Rfl:   •  ranitidine (ZANTAC) 150 MG Tab, Take 150 mg by mouth 2 times a day., Disp: , Rfl:   •  alprazolam (XANAX) 0.25 MG Tab, Take 0.25 mg by mouth as needed. RARE USE, Disp: , Rfl:   ALLERGIES: No Known Allergies  SURGHX:   Past Surgical History:   Procedure Laterality Date   • BETH BY LAPAROSCOPY N/A 1/15/2015    Procedure: BETH BY LAPAROSCOPY;  Surgeon: Hima Joseph M.D.;  Location: SURGERY [facility];  Service:    • RECOVERY  5/23/2012    Performed by SURGERY, IR-RECOVERY at SURGERY SAME DAY [facility] ORS   • OTHER      wisdom teeth     SOCHX:  reports that she has never smoked. She has never used smokeless tobacco. She reports that she does not drink alcohol or use drugs.  FH: Reviewed with patient, not pertinent to this visit.           Patient presents with:  Back Pain: Low back pain , muscle spasm from walking around [facility] last week, then driving back to [place].            Back Pain   This is a new problem. The current episode started in the past 7 days. The problem occurs constantly. The problem is unchanged. The pain is present in the lumbar spine. The quality of the pain is described as cramping and aching. The pain radiates to the left thigh. The pain is at a severity of  "6/10. The pain is the same all the time. The symptoms are aggravated by standing and bending. Stiffness is present all day. Associated symptoms include leg pain. Pertinent negatives include no bladder incontinence, bowel incontinence, numbness, paresthesias or tingling. Risk factors include obesity, sedentary lifestyle and lack of exercise. She has tried heat and NSAIDs for the symptoms. The treatment provided no relief.       Review of Systems   Gastrointestinal: Negative for bowel incontinence.   Genitourinary: Negative for bladder incontinence.   Musculoskeletal: Positive for back pain and myalgias.   Neurological: Negative for tingling, sensory change, focal weakness, numbness and paresthesias.   All other systems reviewed and are negative.         Objective:     /74 (BP Location: Right arm, Patient Position: Sitting, BP Cuff Size: Adult long)   Pulse 92   Temp 37.1 °C (98.8 °F) (Temporal)   Ht 1.6 m (5' 3\")   Wt 93 kg (205 lb)   SpO2 100%   BMI 36.31 kg/m²      Physical Exam   Constitutional: She is oriented to person, place, and time. She appears well-developed and well-nourished. No distress.   HENT:   Head: Normocephalic and atraumatic.   Eyes: Pupils are equal, round, and reactive to light. Conjunctivae and EOM are normal.   Neck: Normal range of motion. Neck supple.   Cardiovascular: Normal rate and regular rhythm.    Pulmonary/Chest: Effort normal and breath sounds normal.   Abdominal: Soft. There is no guarding.   Musculoskeletal:        Lumbar back: She exhibits decreased range of motion, tenderness, pain and spasm. She exhibits no bony tenderness and normal pulse.        Back:    Neurological: She is alert and oriented to person, place, and time. She has normal reflexes. She exhibits normal muscle tone. Coordination normal.   Skin: Skin is warm and dry. Capillary refill takes less than 2 seconds.   Psychiatric: She has a normal mood and affect.   Nursing note and vitals reviewed.       "   Assessment/Plan:     1. Lumbosacral strain, initial encounter  cyclobenzaprine (FLEXERIL) 10 MG Tab   2. Sciatica of left side  cyclobenzaprine (FLEXERIL) 10 MG Tab     PT can continue OTC medications, increase fluids and rest until symptoms improve.     PT instructed not to drive or operate heavy machinery or drink alcohol while taking this medication because it contains either a narcotic or benzodiazepines which causes drowsiness. PT verbalized understanding of these instructions.     [facility] Aware web site evaluation: I have obtained and reviewed patient utilization report from [facility] pharmacy database prior to writing prescription for controlled substance.  No history of abuse.    PT should follow up with PCP in 1-2 days for re-evaluation if symptoms have not improved.  Discussed red flags and reasons to return to UC or ED.  Pt and/or family verbalized understanding of diagnosis and follow up instructions and was offered informational handout on diagnosis.  PT discharged.

## 2019-01-08 ASSESSMENT — ENCOUNTER SYMPTOMS
NUMBNESS: 0
PARESTHESIAS: 0
FOCAL WEAKNESS: 0
LEG PAIN: 1
SENSORY CHANGE: 0
MYALGIAS: 1
BOWEL INCONTINENCE: 0
TINGLING: 0

## 2019-05-05 DIAGNOSIS — R00.2 PALPITATIONS: ICD-10-CM

## 2019-05-06 RX ORDER — METOPROLOL SUCCINATE 50 MG/1
50 TABLET, EXTENDED RELEASE ORAL
Qty: 90 TAB | Refills: 2 | Status: SHIPPED | OUTPATIENT
Start: 2019-05-06 | End: 2019-11-22 | Stop reason: SDUPTHER

## 2019-09-07 ENCOUNTER — OFFICE VISIT (OUTPATIENT)
Dept: URGENT CARE | Facility: PHYSICIAN GROUP | Age: 44
End: 2019-09-07
Payer: COMMERCIAL

## 2019-09-07 VITALS
TEMPERATURE: 99 F | BODY MASS INDEX: 38.95 KG/M2 | HEART RATE: 79 BPM | WEIGHT: 219.8 LBS | DIASTOLIC BLOOD PRESSURE: 58 MMHG | HEIGHT: 63 IN | SYSTOLIC BLOOD PRESSURE: 108 MMHG | OXYGEN SATURATION: 99 %

## 2019-09-07 DIAGNOSIS — B37.2 SKIN YEAST INFECTION: ICD-10-CM

## 2019-09-07 PROCEDURE — 99214 OFFICE O/P EST MOD 30 MIN: CPT | Performed by: PHYSICIAN ASSISTANT

## 2019-09-07 RX ORDER — CLOTRIMAZOLE 1 %
CREAM (GRAM) TOPICAL
Qty: 1 TUBE | Refills: 0 | Status: SHIPPED | OUTPATIENT
Start: 2019-09-07 | End: 2021-03-05

## 2019-09-07 RX ORDER — FLUCONAZOLE 150 MG/1
150 TABLET ORAL DAILY
Qty: 1 TAB | Refills: 0 | Status: SHIPPED | OUTPATIENT
Start: 2019-09-07 | End: 2019-11-22

## 2019-09-07 ASSESSMENT — ENCOUNTER SYMPTOMS
SHORTNESS OF BREATH: 0
MYALGIAS: 0
VOMITING: 0
SORE THROAT: 0
FEVER: 0
NAUSEA: 0
EYE REDNESS: 0
HEADACHES: 0
EYE DISCHARGE: 0
COUGH: 0

## 2019-09-07 NOTE — PROGRESS NOTES
Subjective:      Bela Gonzalez is a 43 y.o. female who presents with Rash (Rash located underneath breast, rash gradually getting worse, irritated and spreading, x1 week )        Rash   This is a new problem. Episode onset: x 2 weeks. The problem has been gradually worsening since onset. The affected locations include the chest. The rash is characterized by itchiness (and irritation). She was exposed to nothing. Pertinent negatives include no congestion, cough, fever, shortness of breath, sore throat or vomiting. Treatments tried: Diflucan and OTC Monostat Cream. The treatment provided mild relief.     The patient presents to clinic c/o a rash to her chest and breasts x 2 weeks. The patient states the rash has gradually progressed. The patient describes the rash as irritated, worse with wearing a bra. The patient saw her PCP on Monday who said the rash was likely due to a yeast infection. The patient was prescribed a one time dose of Diflucan. She has also been applying OTC Monostat cream. The patient reports associated redness to the rash. She denies increased warmth or discharge/drainage. No fever.    PMH:  has a past medical history of Diabetes, Elevated WBC count, Heart burn, Inappropriate sinus node tachycardia, Indigestion, Migraine, Pain (01/07/15), and Scoliosis.  MEDS:   Current Outpatient Medications:   •  metoprolol SR (TOPROL XL) 50 MG TABLET SR 24 HR, Take 1 Tab by mouth every bedtime. Needs to be seen for further refills. Thank you, Disp: 90 Tab, Rfl: 2  •  topiramate (TOPAMAX) 25 MG Tab, , Disp: , Rfl:   •  MULTIPLE VITAMIN PO, Take 1 Tab by mouth every day., Disp: , Rfl:   •  alprazolam (XANAX) 0.25 MG Tab, Take 0.25 mg by mouth as needed. RARE USE, Disp: , Rfl:   •  citalopram (CELEXA) 20 MG Tab, Take 20 mg by mouth every day., Disp: , Rfl:   •  minocycline (MINOCIN) 100 MG Cap, Take 100 mg by mouth every day., Disp: , Rfl:   •  ranitidine (ZANTAC) 150 MG Tab, Take 150 mg by mouth 2 times a  "day., Disp: , Rfl:   ALLERGIES: No Known Allergies  SURGHX:   Past Surgical History:   Procedure Laterality Date   • BETH BY LAPAROSCOPY N/A 1/15/2015    Procedure: BETH BY LAPAROSCOPY;  Surgeon: Hima Joseph M.D.;  Location: SURGERY [facility];  Service:    • RECOVERY  5/23/2012    Performed by SURGERY, IR-RECOVERY at SURGERY SAME DAY [facility] ORS   • OTHER      wisdom teeth     SOCHX:  reports that she has never smoked. She has never used smokeless tobacco. She reports that she does not drink alcohol or use drugs.  FH: Family history was reviewed, no pertinent findings to report    Review of Systems   Constitutional: Negative for fever.   HENT: Negative for congestion, ear pain and sore throat.    Eyes: Negative for discharge and redness.   Respiratory: Negative for cough and shortness of breath.    Cardiovascular: Negative for chest pain and leg swelling.   Gastrointestinal: Negative for nausea and vomiting.   Musculoskeletal: Negative for myalgias.   Skin: Positive for rash.   Neurological: Negative for headaches.          Objective:     /58 (BP Location: Right arm, Patient Position: Sitting, BP Cuff Size: Large adult)   Pulse 79   Temp 37.2 °C (99 °F) (Temporal)   Ht 1.6 m (5' 3\")   Wt 99.7 kg (219 lb 12.8 oz)   SpO2 99%   BMI 38.94 kg/m²      Physical Exam   Constitutional: She is oriented to person, place, and time. She appears well-developed and well-nourished. No distress.   HENT:   Head: Normocephalic and atraumatic.   Right Ear: External ear normal.   Left Ear: External ear normal.   Nose: Nose normal.   Eyes: Conjunctivae and EOM are normal.   Neck: Normal range of motion. Neck supple.   Cardiovascular: Normal rate.   Pulmonary/Chest: Effort normal.   Musculoskeletal: Normal range of motion.   Neurological: She is alert and oriented to person, place, and time.   Skin: Skin is warm and dry. Rash noted.        Erythematous papular rash to the chest, under the bilateral breasts, " consistent with a yeast infection. No tenderness to palpation. No swelling. No increased warmth. No drainage/weeping. No pustules. No vesicles.               Assessment/Plan:     1. Skin yeast infection  - clotrimazole (LOTRIMIN) 1 % Cream; Apply a small amount to the affected area twice daily x 7 days.  Dispense: 1 Tube; Refill: 0  - fluconazole (DIFLUCAN) 150 MG tablet; Take 1 Tab by mouth every day.  Dispense: 1 Tab; Refill: 0    Differential diagnoses, supportive care, and indications for immediate follow-up discussed with patient.   Instructed to return to clinic or nearest emergency department for any change in condition, further concerns, or worsening of symptoms.    Keep area clean and dry  Follow-up with PCP  Return to clinic or go to the ED if symptoms worsen or fail to improve, or if the patient should develop worsening/increasing rash, pain/tenderness, swelling, redness or warmth to the affected area, discharge/drainage, fever/chills, secondary signs of infection, and/or any concerning symptoms.    Discussed plan with the patient, and she agrees to the above.

## 2019-11-15 ENCOUNTER — HOSPITAL ENCOUNTER (OUTPATIENT)
Dept: RADIOLOGY | Facility: MEDICAL CENTER | Age: 44
End: 2019-11-15
Attending: SURGERY
Payer: COMMERCIAL

## 2019-11-15 DIAGNOSIS — E04.1 LEFT THYROID NODULE: ICD-10-CM

## 2019-11-15 PROCEDURE — 76536 US EXAM OF HEAD AND NECK: CPT

## 2019-11-22 ENCOUNTER — OFFICE VISIT (OUTPATIENT)
Dept: CARDIOLOGY | Facility: MEDICAL CENTER | Age: 44
End: 2019-11-22
Payer: COMMERCIAL

## 2019-11-22 VITALS
OXYGEN SATURATION: 100 % | BODY MASS INDEX: 39.28 KG/M2 | DIASTOLIC BLOOD PRESSURE: 82 MMHG | HEART RATE: 58 BPM | HEIGHT: 63 IN | SYSTOLIC BLOOD PRESSURE: 122 MMHG | WEIGHT: 221.67 LBS

## 2019-11-22 DIAGNOSIS — I47.11 INAPPROPRIATE SINUS TACHYCARDIA: ICD-10-CM

## 2019-11-22 DIAGNOSIS — E66.3 OVERWEIGHT: ICD-10-CM

## 2019-11-22 DIAGNOSIS — R00.2 PALPITATIONS: ICD-10-CM

## 2019-11-22 PROCEDURE — 99214 OFFICE O/P EST MOD 30 MIN: CPT | Performed by: NURSE PRACTITIONER

## 2019-11-22 RX ORDER — METOPROLOL SUCCINATE 50 MG/1
50 TABLET, EXTENDED RELEASE ORAL
Qty: 90 TAB | Refills: 3 | Status: SHIPPED | OUTPATIENT
Start: 2019-11-22 | End: 2020-06-08

## 2019-11-22 ASSESSMENT — ENCOUNTER SYMPTOMS
MYALGIAS: 0
SHORTNESS OF BREATH: 0
ABDOMINAL PAIN: 0
PND: 0
FEVER: 0
NERVOUS/ANXIOUS: 0
PALPITATIONS: 1
ORTHOPNEA: 0
CLAUDICATION: 0
COUGH: 0

## 2019-11-22 NOTE — PROGRESS NOTES
Subjective:   Bela Gonzalez is a 39 y.o. female who presents today for yearly follow up for inappropriate sinus tachycardia.    She is a previous patient of Dr. Argueta and Dr. Bellamy in our office.     Hx of palpitations with sinus tachycardia, gestational diabetes, and obesity.    She has gained a little more weight but is working on diet and exercise again with weight watchers.    She continues to notice mild palpitations right before she takes her medication at night. Overall, she thinks the medication is doing well for her.    She has no other cardiac complaints.    Past Medical History:   Diagnosis Date   • Diabetes     gestational   • Elevated WBC count    • Heart burn    • Inappropriate sinus node tachycardia    • Indigestion    • Migraine    • Pain 01/07/15    Back=chronic(scoliosis)>4/10   • Scoliosis      Past Surgical History:   Procedure Laterality Date   • BETH BY LAPAROSCOPY N/A 1/15/2015    Procedure: BETH BY LAPAROSCOPY;  Surgeon: Hima Joseph M.D.;  Location: SURGERY [facility];  Service:    • RECOVERY  5/23/2012    Performed by SURGERY, IR-RECOVERY at SURGERY SAME DAY [facility] ORS   • OTHER      wisdom teeth     Family History   Problem Relation Age of Onset   • Diabetes Mother    • Asthma Sister      Social History     Tobacco Use   Smoking Status Never Smoker   Smokeless Tobacco Never Used     No Known Allergies  Outpatient Encounter Medications as of 11/22/2019   Medication Sig Dispense Refill   • diphenhydrAMINE HCl (BENADRYL ALLERGY PO) Take  by mouth at bedtime as needed.     • metoprolol SR (TOPROL XL) 50 MG TABLET SR 24 HR Take 1 Tab by mouth every bedtime. 90 Tab 3   • clotrimazole (LOTRIMIN) 1 % Cream Apply a small amount to the affected area twice daily x 7 days. 1 Tube 0   • topiramate (TOPAMAX) 25 MG Tab      • MULTIPLE VITAMIN PO Take 1 Tab by mouth every day.     • alprazolam (XANAX) 0.25 MG Tab Take 0.25 mg by mouth as needed. RARE USE     • citalopram (CELEXA) 20  "MG Tab Take 20 mg by mouth every day.     • minocycline (MINOCIN) 100 MG Cap Take 100 mg by mouth every day.     • ranitidine (ZANTAC) 150 MG Tab Take 150 mg by mouth 2 times a day.     • [DISCONTINUED] fluconazole (DIFLUCAN) 150 MG tablet Take 1 Tab by mouth every day. (Patient not taking: Reported on 11/22/2019) 1 Tab 0   • [DISCONTINUED] metoprolol SR (TOPROL XL) 50 MG TABLET SR 24 HR Take 1 Tab by mouth every bedtime. Needs to be seen for further refills. Thank you 90 Tab 2     No facility-administered encounter medications on file as of 11/22/2019.      Review of Systems   Constitutional: Negative for fever and malaise/fatigue.   Respiratory: Negative for cough and shortness of breath.    Cardiovascular: Positive for palpitations. Negative for chest pain, orthopnea, claudication, leg swelling and PND.   Gastrointestinal: Negative for abdominal pain.   Musculoskeletal: Negative for myalgias.   Neurological: Weakness:    Psychiatric/Behavioral: The patient is not nervous/anxious.    All other systems reviewed and are negative.       Objective:   /82 (BP Location: Right arm, Patient Position: Sitting, BP Cuff Size: Adult)   Pulse (!) 58   Ht 1.6 m (5' 3\")   Wt 100.5 kg (221 lb 10.8 oz)   SpO2 100%   BMI 39.27 kg/m²     Physical Exam   Constitutional: She is oriented to person, place, and time. She appears well-developed and well-nourished. No distress.   obese     HENT:   Head: Normocephalic and atraumatic.   Eyes: EOM are normal.   Neck: Normal range of motion. No JVD present.   Cardiovascular: Normal rate, regular rhythm, normal heart sounds and intact distal pulses.   No murmur heard.  Pulmonary/Chest: Effort normal and breath sounds normal. No respiratory distress.   Abdominal: Soft. Bowel sounds are normal.   Musculoskeletal: Normal range of motion.         General: No edema.   Neurological: She is alert and oriented to person, place, and time.   Skin: Skin is warm and dry.   Psychiatric: She has a " normal mood and affect.   Nursing note and vitals reviewed.    Reviewed with patient  Lab Results   Component Value Date/Time    CHOLSTRLTOT 176 08/19/2016 07:11 AM     (H) 08/19/2016 07:11 AM    HDL 56 08/19/2016 07:11 AM    TRIGLYCERIDE 102 08/19/2016 07:11 AM      LABS WITH OB IN 2 WEEKS    2010 ECHO WNL  Assessment:     1. Palpitations  metoprolol SR (TOPROL XL) 50 MG TABLET SR 24 HR   2. Inappropriate sinus tachycardia     3. Overweight       Medical Decision Making:  Today's Assessment / Status / Plan:     1. Palpitations from inappropriate sinus tachycardia  -cont metoprolol 50 mg XL QPM  -call if palpitations not controlled  -echo in '10 unremarkable  -consider repeat holter at next apt    2. Overweight  -lifestyle modifications  -cont with dietary changes    FU in clinic in 12 months with SC with annual labs with PCP    Patient verbalizes understanding and agrees with the plan of care.     Collaborating MD: Jose SHERIDAN    Physical Exam   Constitutional: She is oriented to person, place, and time. She appears well-developed and well-nourished. No distress.   obese     HENT:   Head: Normocephalic and atraumatic.   Eyes: EOM are normal.   Neck: Normal range of motion. No JVD present.   Cardiovascular: Normal rate, regular rhythm, normal heart sounds and intact distal pulses.   No murmur heard.  Pulmonary/Chest: Effort normal and breath sounds normal. No respiratory distress.   Abdominal: Soft. Bowel sounds are normal.   Musculoskeletal: Normal range of motion.         General: No edema.   Neurological: She is alert and oriented to person, place, and time.   Skin: Skin is warm and dry.   Psychiatric: She has a normal mood and affect.   Nursing note and vitals reviewed.

## 2019-12-06 ENCOUNTER — HOSPITAL ENCOUNTER (OUTPATIENT)
Dept: LAB | Facility: MEDICAL CENTER | Age: 44
End: 2019-12-06
Attending: FAMILY MEDICINE
Payer: COMMERCIAL

## 2019-12-06 LAB
ALBUMIN SERPL BCP-MCNC: 3.9 G/DL (ref 3.2–4.9)
ALBUMIN/GLOB SERPL: 1.4 G/DL
ALP SERPL-CCNC: 48 U/L (ref 30–99)
ALT SERPL-CCNC: 20 U/L (ref 2–50)
ANION GAP SERPL CALC-SCNC: 4 MMOL/L (ref 0–11.9)
APPEARANCE UR: CLEAR
AST SERPL-CCNC: 21 U/L (ref 12–45)
BASOPHILS # BLD AUTO: 0.4 % (ref 0–1.8)
BASOPHILS # BLD: 0.04 K/UL (ref 0–0.12)
BILIRUB SERPL-MCNC: 0.3 MG/DL (ref 0.1–1.5)
BILIRUB UR QL STRIP.AUTO: NEGATIVE
BUN SERPL-MCNC: 15 MG/DL (ref 8–22)
CALCIUM SERPL-MCNC: 8.8 MG/DL (ref 8.5–10.5)
CHLORIDE SERPL-SCNC: 114 MMOL/L (ref 96–112)
CHOLEST SERPL-MCNC: 200 MG/DL (ref 100–199)
CO2 SERPL-SCNC: 22 MMOL/L (ref 20–33)
COLOR UR: YELLOW
CREAT SERPL-MCNC: 0.97 MG/DL (ref 0.5–1.4)
EOSINOPHIL # BLD AUTO: 0.24 K/UL (ref 0–0.51)
EOSINOPHIL NFR BLD: 2.7 % (ref 0–6.9)
ERYTHROCYTE [DISTWIDTH] IN BLOOD BY AUTOMATED COUNT: 46.1 FL (ref 35.9–50)
EST. AVERAGE GLUCOSE BLD GHB EST-MCNC: 120 MG/DL
FASTING STATUS PATIENT QL REPORTED: NORMAL
GLOBULIN SER CALC-MCNC: 2.7 G/DL (ref 1.9–3.5)
GLUCOSE SERPL-MCNC: 91 MG/DL (ref 65–99)
GLUCOSE UR STRIP.AUTO-MCNC: NEGATIVE MG/DL
HBA1C MFR BLD: 5.8 % (ref 0–5.6)
HCT VFR BLD AUTO: 42.9 % (ref 37–47)
HDLC SERPL-MCNC: 47 MG/DL
HGB BLD-MCNC: 13.3 G/DL (ref 12–16)
IMM GRANULOCYTES # BLD AUTO: 0.02 K/UL (ref 0–0.11)
IMM GRANULOCYTES NFR BLD AUTO: 0.2 % (ref 0–0.9)
KETONES UR STRIP.AUTO-MCNC: NEGATIVE MG/DL
LDLC SERPL CALC-MCNC: 132 MG/DL
LEUKOCYTE ESTERASE UR QL STRIP.AUTO: NEGATIVE
LYMPHOCYTES # BLD AUTO: 2.7 K/UL (ref 1–4.8)
LYMPHOCYTES NFR BLD: 30 % (ref 22–41)
MCH RBC QN AUTO: 28.8 PG (ref 27–33)
MCHC RBC AUTO-ENTMCNC: 31 G/DL (ref 33.6–35)
MCV RBC AUTO: 92.9 FL (ref 81.4–97.8)
MICRO URNS: NORMAL
MONOCYTES # BLD AUTO: 0.75 K/UL (ref 0–0.85)
MONOCYTES NFR BLD AUTO: 8.3 % (ref 0–13.4)
NEUTROPHILS # BLD AUTO: 5.25 K/UL (ref 2–7.15)
NEUTROPHILS NFR BLD: 58.4 % (ref 44–72)
NITRITE UR QL STRIP.AUTO: NEGATIVE
NRBC # BLD AUTO: 0 K/UL
NRBC BLD-RTO: 0 /100 WBC
PH UR STRIP.AUTO: 7 [PH] (ref 5–8)
PLATELET # BLD AUTO: 332 K/UL (ref 164–446)
PMV BLD AUTO: 9.6 FL (ref 9–12.9)
POTASSIUM SERPL-SCNC: 4.1 MMOL/L (ref 3.6–5.5)
PROT SERPL-MCNC: 6.6 G/DL (ref 6–8.2)
PROT UR QL STRIP: NEGATIVE MG/DL
RBC # BLD AUTO: 4.62 M/UL (ref 4.2–5.4)
RBC UR QL AUTO: NEGATIVE
SODIUM SERPL-SCNC: 140 MMOL/L (ref 135–145)
SP GR UR STRIP.AUTO: 1.02
TRIGL SERPL-MCNC: 106 MG/DL (ref 0–149)
UROBILINOGEN UR STRIP.AUTO-MCNC: 0.2 MG/DL
WBC # BLD AUTO: 9 K/UL (ref 4.8–10.8)

## 2019-12-06 PROCEDURE — 80053 COMPREHEN METABOLIC PANEL: CPT

## 2019-12-06 PROCEDURE — 83036 HEMOGLOBIN GLYCOSYLATED A1C: CPT

## 2019-12-06 PROCEDURE — 85025 COMPLETE CBC W/AUTO DIFF WBC: CPT

## 2019-12-06 PROCEDURE — 36415 COLL VENOUS BLD VENIPUNCTURE: CPT

## 2019-12-06 PROCEDURE — 81003 URINALYSIS AUTO W/O SCOPE: CPT

## 2019-12-06 PROCEDURE — 80061 LIPID PANEL: CPT

## 2020-01-22 ENCOUNTER — OFFICE VISIT (OUTPATIENT)
Dept: URGENT CARE | Facility: PHYSICIAN GROUP | Age: 45
End: 2020-01-22
Payer: COMMERCIAL

## 2020-01-22 VITALS
RESPIRATION RATE: 18 BRPM | HEIGHT: 63 IN | HEART RATE: 82 BPM | WEIGHT: 223 LBS | BODY MASS INDEX: 39.51 KG/M2 | DIASTOLIC BLOOD PRESSURE: 80 MMHG | TEMPERATURE: 98.2 F | OXYGEN SATURATION: 100 % | SYSTOLIC BLOOD PRESSURE: 118 MMHG

## 2020-01-22 DIAGNOSIS — J06.9 UPPER RESPIRATORY TRACT INFECTION, UNSPECIFIED TYPE: Primary | ICD-10-CM

## 2020-01-22 DIAGNOSIS — J01.40 ACUTE NON-RECURRENT PANSINUSITIS: ICD-10-CM

## 2020-01-22 DIAGNOSIS — J02.9 SORE THROAT: ICD-10-CM

## 2020-01-22 LAB
INT CON NEG: NORMAL
INT CON POS: NORMAL
S PYO AG THROAT QL: NEGATIVE

## 2020-01-22 PROCEDURE — 99214 OFFICE O/P EST MOD 30 MIN: CPT | Performed by: PHYSICIAN ASSISTANT

## 2020-01-22 PROCEDURE — 87880 STREP A ASSAY W/OPTIC: CPT | Performed by: PHYSICIAN ASSISTANT

## 2020-01-22 RX ORDER — AMOXICILLIN AND CLAVULANATE POTASSIUM 875; 125 MG/1; MG/1
1 TABLET, FILM COATED ORAL 2 TIMES DAILY
Qty: 10 TAB | Refills: 0 | Status: SHIPPED | OUTPATIENT
Start: 2020-01-22 | End: 2020-01-27

## 2020-01-22 RX ORDER — BENZONATATE 100 MG/1
100 CAPSULE ORAL 3 TIMES DAILY PRN
Qty: 15 CAP | Refills: 0 | Status: SHIPPED | OUTPATIENT
Start: 2020-01-22 | End: 2020-01-27

## 2020-01-22 ASSESSMENT — ENCOUNTER SYMPTOMS
COUGH: 1
FEVER: 0
DIARRHEA: 0
SORE THROAT: 1
CONSTIPATION: 0
MYALGIAS: 0
WHEEZING: 0
SPUTUM PRODUCTION: 1
CHILLS: 1
VOMITING: 0
SHORTNESS OF BREATH: 0
NAUSEA: 0
ABDOMINAL PAIN: 0

## 2020-01-22 NOTE — LETTER
January 22, 2020         Patient: Bela Gonzalez   YOB: 1975   Date of Visit: 1/22/2020           To Whom it May Concern:    Bela Gonzalez was seen in my clinic on 1/22/2020. She may return to work on 1/25/20 or sooner if symptoms improve.    If you have any questions or concerns, please don't hesitate to call.        Sincerely,           Claudia Marie P.A.-C.  Electronically Signed

## 2020-01-23 NOTE — PROGRESS NOTES
Subjective:   Bela Gonzalez is a 44 y.o. female who presents for Sore Throat (cough, sore throat, congested, x1.5 week, sinus pressure )        Cough   This is a new problem. Episode onset: 1.5 weeks  The problem has been unchanged. The cough is productive of sputum. Associated symptoms include chills, ear congestion, ear pain, nasal congestion and a sore throat. Pertinent negatives include no fever, myalgias, shortness of breath or wheezing. Risk factors: Coworkers with URI. Pt received flu shot.  Treatments tried: dayquil and cough drops  Her past medical history is significant for bronchitis. There is no history of asthma or pneumonia.     Review of Systems   Constitutional: Positive for chills. Negative for fever and malaise/fatigue.   HENT: Positive for congestion, ear pain and sore throat.    Respiratory: Positive for cough and sputum production. Negative for shortness of breath and wheezing.    Gastrointestinal: Negative for abdominal pain, constipation, diarrhea, nausea and vomiting.   Musculoskeletal: Negative for myalgias.   All other systems reviewed and are negative.      PMH:  has a past medical history of Diabetes, Elevated WBC count, Heart burn, Inappropriate sinus node tachycardia, Indigestion, Migraine, Pain (01/07/15), and Scoliosis.  MEDS:   Current Outpatient Medications:   •  Pseudoephedrine-APAP-DM (DAYQUIL PO), Take  by mouth., Disp: , Rfl:   •  amoxicillin-clavulanate (AUGMENTIN) 875-125 MG Tab, Take 1 Tab by mouth 2 times a day for 5 days., Disp: 10 Tab, Rfl: 0  •  benzonatate (TESSALON) 100 MG Cap, Take 1 Cap by mouth 3 times a day as needed for Cough for up to 5 days., Disp: 15 Cap, Rfl: 0  •  diphenhydrAMINE HCl (BENADRYL ALLERGY PO), Take  by mouth at bedtime as needed., Disp: , Rfl:   •  metoprolol SR (TOPROL XL) 50 MG TABLET SR 24 HR, Take 1 Tab by mouth every bedtime., Disp: 90 Tab, Rfl: 3  •  topiramate (TOPAMAX) 25 MG Tab, , Disp: , Rfl:   •  MULTIPLE VITAMIN PO, Take 1 Tab  "by mouth every day., Disp: , Rfl:   •  alprazolam (XANAX) 0.25 MG Tab, Take 0.25 mg by mouth as needed. RARE USE, Disp: , Rfl:   •  citalopram (CELEXA) 20 MG Tab, Take 20 mg by mouth every day., Disp: , Rfl:   •  minocycline (MINOCIN) 100 MG Cap, Take 100 mg by mouth every day., Disp: , Rfl:   •  ranitidine (ZANTAC) 150 MG Tab, Take 150 mg by mouth 2 times a day., Disp: , Rfl:   •  clotrimazole (LOTRIMIN) 1 % Cream, Apply a small amount to the affected area twice daily x 7 days., Disp: 1 Tube, Rfl: 0  ALLERGIES: No Known Allergies  SURGHX:   Past Surgical History:   Procedure Laterality Date   • BETH BY LAPAROSCOPY N/A 1/15/2015    Procedure: BETH BY LAPAROSCOPY;  Surgeon: Hima Joseph M.D.;  Location: SURGERY [facility];  Service:    • RECOVERY  5/23/2012    Performed by SURGERY, IR-RECOVERY at SURGERY SAME DAY [facility] ORS   • OTHER      wisdom teeth     SOCHX:  reports that she has never smoked. She has never used smokeless tobacco. She reports that she does not drink alcohol or use drugs.  Family History   Problem Relation Age of Onset   • Diabetes Mother    • Asthma Sister         Objective:   /80 (BP Location: Right arm, Patient Position: Sitting, BP Cuff Size: Large adult)   Pulse 82   Temp 36.8 °C (98.2 °F) (Temporal)   Resp 18   Ht 1.6 m (5' 3\")   Wt 101.2 kg (223 lb)   SpO2 100%   BMI 39.50 kg/m²     Physical Exam  Vitals signs reviewed.   Constitutional:       General: She is not in acute distress.     Appearance: She is well-developed.   HENT:      Head: Normocephalic and atraumatic.      Right Ear: Tympanic membrane and external ear normal.      Left Ear: Tympanic membrane and external ear normal.      Nose: Nasal tenderness, mucosal edema and congestion present.      Right Sinus: Maxillary sinus tenderness present.      Left Sinus: Maxillary sinus tenderness present.      Mouth/Throat:      Mouth: Mucous membranes are moist.      Pharynx: Oropharynx is clear. Uvula midline.      " Tonsils: No tonsillar exudate or tonsillar abscesses. Swellin+ on the right. 1+ on the left.   Eyes:      Conjunctiva/sclera: Conjunctivae normal.      Pupils: Pupils are equal, round, and reactive to light.   Neck:      Musculoskeletal: Normal range of motion and neck supple. No neck rigidity or muscular tenderness.      Trachea: No tracheal deviation.   Cardiovascular:      Rate and Rhythm: Normal rate and regular rhythm.   Pulmonary:      Effort: Pulmonary effort is normal. No respiratory distress.      Breath sounds: Normal breath sounds. No wheezing or rales.   Lymphadenopathy:      Cervical: Cervical adenopathy present.   Skin:     General: Skin is warm and dry.      Capillary Refill: Capillary refill takes less than 2 seconds.   Neurological:      General: No focal deficit present.      Mental Status: She is alert and oriented to person, place, and time.   Psychiatric:         Mood and Affect: Mood normal.         Behavior: Behavior normal.     Rapid strep: Negative      Assessment/Plan:     1. Upper respiratory tract infection, unspecified type  amoxicillin-clavulanate (AUGMENTIN) 875-125 MG Tab    benzonatate (TESSALON) 100 MG Cap   2. Acute non-recurrent pansinusitis  amoxicillin-clavulanate (AUGMENTIN) 875-125 MG Tab   3. Sore throat  POCT Rapid Strep A     Supportive care reviewed.  Start OTC decongestant, Flonase, nasal saline rinse. Patient was given a contingent antibiotic prescription to fill and use as directed if symptoms progressed as discussed and agreed upon.  URI handout provided.    Follow-up with primary care provider within 7-10 days.  If symptoms worsen or persist patient can return to clinic for reevaluation. All side effects of medication discussed including allergic response, GI upset, tendon injury, etc. Patient and  confirmed understanding of information.    Please note that this dictation was created using voice recognition software. I have made every reasonable attempt to  correct obvious errors, but I expect that there are errors of grammar and possibly content that I did not discover before finalizing the note.

## 2020-01-23 NOTE — PATIENT INSTRUCTIONS
"Upper Respiratory Infection, Adult  Most upper respiratory infections (URIs) are a viral infection of the air passages leading to the lungs. A URI affects the nose, throat, and upper air passages. The most common type of URI is nasopharyngitis and is typically referred to as \"the common cold.\"  URIs run their course and usually go away on their own. Most of the time, a URI does not require medical attention, but sometimes a bacterial infection in the upper airways can follow a viral infection. This is called a secondary infection. Sinus and middle ear infections are common types of secondary upper respiratory infections.  Bacterial pneumonia can also complicate a URI. A URI can worsen asthma and chronic obstructive pulmonary disease (COPD). Sometimes, these complications can require emergency medical care and may be life threatening.  What are the causes?  Almost all URIs are caused by viruses. A virus is a type of germ and can spread from one person to another.  What increases the risk?  You may be at risk for a URI if:  · You smoke.  · You have chronic heart or lung disease.  · You have a weakened defense (immune) system.  · You are very young or very old.  · You have nasal allergies or asthma.  · You work in crowded or poorly ventilated areas.  · You work in health care facilities or schools.  What are the signs or symptoms?  Symptoms typically develop 2-3 days after you come in contact with a cold virus. Most viral URIs last 7-10 days. However, viral URIs from the influenza virus (flu virus) can last 14-18 days and are typically more severe. Symptoms may include:  · Runny or stuffy (congested) nose.  · Sneezing.  · Cough.  · Sore throat.  · Headache.  · Fatigue.  · Fever.  · Loss of appetite.  · Pain in your forehead, behind your eyes, and over your cheekbones (sinus pain).  · Muscle aches.  How is this diagnosed?  Your health care provider may diagnose a URI by:  · Physical exam.  · Tests to check that your " symptoms are not due to another condition such as:  ¨ Strep throat.  ¨ Sinusitis.  ¨ Pneumonia.  ¨ Asthma.  How is this treated?  A URI goes away on its own with time. It cannot be cured with medicines, but medicines may be prescribed or recommended to relieve symptoms. Medicines may help:  · Reduce your fever.  · Reduce your cough.  · Relieve nasal congestion.  Follow these instructions at home:  · Take medicines only as directed by your health care provider.  · Gargle warm saltwater or take cough drops to comfort your throat as directed by your health care provider.  · Use a warm mist humidifier or inhale steam from a shower to increase air moisture. This may make it easier to breathe.  · Drink enough fluid to keep your urine clear or pale yellow.  · Eat soups and other clear broths and maintain good nutrition.  · Rest as needed.  · Return to work when your temperature has returned to normal or as your health care provider advises. You may need to stay home longer to avoid infecting others. You can also use a face mask and careful hand washing to prevent spread of the virus.  · Increase the usage of your inhaler if you have asthma.  · Do not use any tobacco products, including cigarettes, chewing tobacco, or electronic cigarettes. If you need help quitting, ask your health care provider.  How is this prevented?  The best way to protect yourself from getting a cold is to practice good hygiene.  · Avoid oral or hand contact with people with cold symptoms.  · Wash your hands often if contact occurs.  There is no clear evidence that vitamin C, vitamin E, echinacea, or exercise reduces the chance of developing a cold. However, it is always recommended to get plenty of rest, exercise, and practice good nutrition.  Contact a health care provider if:  · You are getting worse rather than better.  · Your symptoms are not controlled by medicine.  · You have chills.  · You have worsening shortness of breath.  · You have brown  or red mucus.  · You have yellow or brown nasal discharge.  · You have pain in your face, especially when you bend forward.  · You have a fever.  · You have swollen neck glands.  · You have pain while swallowing.  · You have white areas in the back of your throat.  Get help right away if:  · You have severe or persistent:  ¨ Headache.  ¨ Ear pain.  ¨ Sinus pain.  ¨ Chest pain.  · You have chronic lung disease and any of the following:  ¨ Wheezing.  ¨ Prolonged cough.  ¨ Coughing up blood.  ¨ A change in your usual mucus.  · You have a stiff neck.  · You have changes in your:  ¨ Vision.  ¨ Hearing.  ¨ Thinking.  ¨ Mood.  This information is not intended to replace advice given to you by your health care provider. Make sure you discuss any questions you have with your health care provider.  Document Released: 06/13/2002 Document Revised: 08/20/2017 Document Reviewed: 03/25/2015  [employer] Interactive Patient Education © 2017 [employer]

## 2020-05-11 ENCOUNTER — PATIENT MESSAGE (OUTPATIENT)
Dept: CARDIOLOGY | Facility: MEDICAL CENTER | Age: 45
End: 2020-05-11

## 2020-05-11 DIAGNOSIS — R00.2 PALPITATIONS: ICD-10-CM

## 2020-06-08 RX ORDER — METOPROLOL SUCCINATE 100 MG/1
100 TABLET, EXTENDED RELEASE ORAL
Qty: 90 TAB | Refills: 3 | Status: SHIPPED
Start: 2020-06-08 | End: 2020-09-18

## 2020-08-06 ENCOUNTER — HOSPITAL ENCOUNTER (OUTPATIENT)
Facility: MEDICAL CENTER | Age: 45
End: 2020-08-06
Attending: PHYSICIAN ASSISTANT
Payer: COMMERCIAL

## 2020-08-06 ENCOUNTER — TELEMEDICINE (OUTPATIENT)
Dept: TELEHEALTH | Facility: TELEMEDICINE | Age: 45
End: 2020-08-06
Payer: COMMERCIAL

## 2020-08-06 DIAGNOSIS — R39.15 URINARY URGENCY: ICD-10-CM

## 2020-08-06 DIAGNOSIS — Z3A.01 LESS THAN 8 WEEKS GESTATION OF PREGNANCY: ICD-10-CM

## 2020-08-06 PROCEDURE — 99214 OFFICE O/P EST MOD 30 MIN: CPT | Mod: 95,CR | Performed by: PHYSICIAN ASSISTANT

## 2020-08-06 PROCEDURE — 87086 URINE CULTURE/COLONY COUNT: CPT

## 2020-08-06 NOTE — PROGRESS NOTES
Telemedicine Visit: Established Patient     This evaluation was conducted via Zoom, using secure and encrypted videoconferencing technology.  The patient was physical located at Home in [place], [place] and the physician was located in [facility].  The patient was presented by self, at home.  The patient's identity was confirmed and verbal consent for the telemedicine encounter was obtained.      Subjective:   CC: Urinary urgency  Bela Gonzalez is a 44 y.o. female presenting for evaluation and management of:    Urinary urgency with mild burning for 2 days    ROS   Denies any recent fevers or chills. No nausea or vomiting. No chest pains or shortness of breath.     No Known Allergies    Current medicines (including changes today)  Current Outpatient Medications   Medication Sig Dispense Refill   • metoprolol SR (TOPROL XL) 100 MG TABLET SR 24 HR Take 1 Tab by mouth every bedtime. 90 Tab 3   • Pseudoephedrine-APAP-DM (DAYQUIL PO) Take  by mouth.     • diphenhydrAMINE HCl (BENADRYL ALLERGY PO) Take  by mouth at bedtime as needed.     • clotrimazole (LOTRIMIN) 1 % Cream Apply a small amount to the affected area twice daily x 7 days. 1 Tube 0   • topiramate (TOPAMAX) 25 MG Tab      • MULTIPLE VITAMIN PO Take 1 Tab by mouth every day.     • alprazolam (XANAX) 0.25 MG Tab Take 0.25 mg by mouth as needed. RARE USE     • citalopram (CELEXA) 20 MG Tab Take 20 mg by mouth every day.     • minocycline (MINOCIN) 100 MG Cap Take 100 mg by mouth every day.     • ranitidine (ZANTAC) 150 MG Tab Take 150 mg by mouth 2 times a day.       No current facility-administered medications for this visit.        Patient Active Problem List    Diagnosis Date Noted   • Palpitations 06/04/2013     Priority: Medium   • Inappropriate sinus tachycardia 03/07/2012     Priority: Medium   • Other specified disorder of gallbladder 01/15/2015     Priority: Low   • Elevated glucose 09/19/2014     Priority: Low   • FHx: migraine headaches 03/07/2012      Priority: Low   • Overweight 03/07/2012     Priority: Low   • Methamphetamine use, remote 03/07/2012     Priority: Low       Family History   Problem Relation Age of Onset   • Diabetes Mother    • Asthma Sister        She  has a past medical history of Diabetes, Elevated WBC count, Heart burn, Inappropriate sinus node tachycardia, Indigestion, Migraine, Pain (01/07/15), and Scoliosis.  She  has a past surgical history that includes recovery (5/23/2012); other; and broderick by laparoscopy (N/A, 1/15/2015).       Objective:   There were no vitals taken for this visit.    Physical Exam:  Constitutional: Alert, no distress, well-groomed.  Skin: No rashes in visible areas.  Eye: Round. Conjunctiva clear, lids normal. No icterus.   ENMT: Lips pink without lesions, good dentition, moist mucous membranes. Phonation normal.  Neck: No masses, no thyromegaly. Moves freely without pain.  CV: Pulse as reported by patient  Respiratory: Unlabored respiratory effort, no cough or audible wheeze  Psych: Alert and oriented x3, normal affect and mood.       Assessment and Plan:   The following treatment plan was discussed:   - Pt is pregnant.  No belly or back pain.  No fever/bodyaches.  No blood from vagina.  - Recommend hold antibiotic while Urine Culture is pending    1. Urinary urgency  - URINE CULTURE(NEW); Future    2. Less than 8 weeks gestation of pregnancy        Follow-up: No follow-ups on file.

## 2020-08-08 LAB
BACTERIA UR CULT: NORMAL
SIGNIFICANT IND 70042: NORMAL
SITE SITE: NORMAL
SOURCE SOURCE: NORMAL

## 2020-08-10 ENCOUNTER — PATIENT MESSAGE (OUTPATIENT)
Dept: CARDIOLOGY | Facility: MEDICAL CENTER | Age: 45
End: 2020-08-10

## 2020-08-12 ENCOUNTER — OFFICE VISIT (OUTPATIENT)
Dept: CARDIOLOGY | Facility: MEDICAL CENTER | Age: 45
End: 2020-08-12
Payer: COMMERCIAL

## 2020-08-12 VITALS
OXYGEN SATURATION: 98 % | SYSTOLIC BLOOD PRESSURE: 124 MMHG | HEART RATE: 112 BPM | WEIGHT: 226.2 LBS | HEIGHT: 63 IN | RESPIRATION RATE: 12 BRPM | DIASTOLIC BLOOD PRESSURE: 90 MMHG | BODY MASS INDEX: 40.08 KG/M2

## 2020-08-12 DIAGNOSIS — R00.2 PALPITATIONS: ICD-10-CM

## 2020-08-12 LAB — EKG IMPRESSION: NORMAL

## 2020-08-12 PROCEDURE — 93000 ELECTROCARDIOGRAM COMPLETE: CPT | Performed by: INTERNAL MEDICINE

## 2020-08-12 PROCEDURE — 99214 OFFICE O/P EST MOD 30 MIN: CPT | Performed by: INTERNAL MEDICINE

## 2020-08-12 RX ORDER — METOPROLOL SUCCINATE 25 MG/1
25 TABLET, EXTENDED RELEASE ORAL DAILY
Qty: 30 TAB | Refills: 0 | Status: SHIPPED | OUTPATIENT
Start: 2020-08-12 | End: 2020-09-18 | Stop reason: SDUPTHER

## 2020-08-12 RX ORDER — FAMOTIDINE 20 MG/1
20 TABLET, FILM COATED ORAL 2 TIMES DAILY
COMMUNITY
Start: 2020-06-01 | End: 2021-03-05

## 2020-08-12 ASSESSMENT — FIBROSIS 4 INDEX: FIB4 SCORE: 0.59

## 2020-08-12 NOTE — PATIENT INSTRUCTIONS
Wean off metoprolol to 50 mg daily for 2 weeks then 25 mg daily for 2 weeks then 12.5 mg daily for 2 weeks.

## 2020-08-12 NOTE — PROGRESS NOTES
Cardiology Follow-up Consultation Note    Date of note:    8/12/2020    Primary Care Provider: Paula Loving M.D.    Name:             Bela Gonzalez   YOB: 1975  MRN:               [id number]    CC: Follow-up palpitations    Patient ID/HPI:   44-year-old female patient has history of inappropriate sinus tachycardia was previously evaluated with Holter monitoring, echocardiogram.  She had an accidental pregnancy, now 5 weeks pregnant.  She has been noticing more palpitations, heart rate goes up to 90s when she is resting that she feels heart racing.  She has occasional dizziness but no fainting spells or near fainting spells.  No chest pain, shortness of breath.  She does have some nausea, morning sickness from pregnancy.  She is G4, P1.      ROS  All other systems reviewed and negative.    Past Medical History:   Diagnosis Date   • Diabetes     gestational   • Elevated WBC count    • Heart burn    • Inappropriate sinus node tachycardia    • Indigestion    • Migraine    • Pain 01/07/15    Back=chronic(scoliosis)>4/10   • Scoliosis          Past Surgical History:   Procedure Laterality Date   • BETH BY LAPAROSCOPY N/A 1/15/2015    Procedure: BETH BY LAPAROSCOPY;  Surgeon: Hima Joseph M.D.;  Location: SURGERY [facility];  Service:    • RECOVERY  5/23/2012    Performed by SURGERY, IR-RECOVERY at SURGERY SAME DAY [facility] ORS   • OTHER      wisdom teeth         Current Outpatient Medications   Medication Sig Dispense Refill   • Omega-3 Fatty Acids (FISH OIL PO) Take  by mouth.     • CALCIUM PO Take  by mouth.     • famotidine (PEPCID) 20 MG Tab Take 20 mg by mouth 2 times a day.     • metoprolol SR (TOPROL XL) 25 MG TABLET SR 24 HR Take 1 Tab by mouth every day. 30 Tab 0   • metoprolol SR (TOPROL XL) 100 MG TABLET SR 24 HR Take 1 Tab by mouth every bedtime. 90 Tab 3   • diphenhydrAMINE HCl (BENADRYL ALLERGY PO) Take  by mouth at bedtime as needed.     • MULTIPLE VITAMIN PO  Take 1 Tab by mouth every day. Indications: Prenantal     • alprazolam (XANAX) 0.25 MG Tab Take 0.25 mg by mouth as needed. RARE USE     • citalopram (CELEXA) 20 MG Tab Take 20 mg by mouth every day.     • clotrimazole (LOTRIMIN) 1 % Cream Apply a small amount to the affected area twice daily x 7 days. 1 Tube 0     No current facility-administered medications for this visit.          Allergies   Allergen Reactions   • Azithromycin      Z-pack- per pt causes severe diarrhea            Family History   Problem Relation Age of Onset   • Diabetes Mother    • Asthma Sister          Social History     Socioeconomic History   • Marital status:      Spouse name: Not on file   • Number of children: Not on file   • Years of education: Not on file   • Highest education level: Not on file   Occupational History   • Not on file   Social Needs   • Financial resource strain: Not on file   • Food insecurity     Worry: Not on file     Inability: Not on file   • Transportation needs     Medical: Not on file     Non-medical: Not on file   Tobacco Use   • Smoking status: Never Smoker   • Smokeless tobacco: Never Used   Substance and Sexual Activity   • Alcohol use: No   • Drug use: No     Comment: quit Meth 9 years ago   • Sexual activity: Not on file   Lifestyle   • Physical activity     Days per week: Not on file     Minutes per session: Not on file   • Stress: Not on file   Relationships   • Social connections     Talks on phone: Not on file     Gets together: Not on file     Attends [employer] service: Not on file     Active member of club or organization: Not on file     Attends meetings of clubs or organizations: Not on file     Relationship status: Not on file   • Intimate partner violence     Fear of current or ex partner: Not on file     Emotionally abused: Not on file     Physically abused: Not on file     Forced sexual activity: Not on file   Other Topics Concern   • Not on file   Social History Narrative   • Not on  "file         Physical Exam:  Ambulatory Vitals  /90 (BP Location: Left arm, Patient Position: Sitting, BP Cuff Size: Adult)   Pulse (!) 112   Resp 12   Ht 1.6 m (5' 3\")   Wt 102.6 kg (226 lb 3.2 oz)   SpO2 98%    Oxygen Therapy:  Pulse Oximetry: 98 %  BP Readings from Last 4 Encounters:   08/12/20 124/90   01/22/20 118/80   11/22/19 122/82   09/07/19 108/58       Weight/BMI: Body mass index is 40.07 kg/m².  Wt Readings from Last 4 Encounters:   08/12/20 102.6 kg (226 lb 3.2 oz)   01/22/20 101.2 kg (223 lb)   11/22/19 100.5 kg (221 lb 10.8 oz)   09/07/19 99.7 kg (219 lb 12.8 oz)       General: Well appearing and in no apparent distress  Head: atrumatic  Eyes: No conjunctival pallor   ENT: normal external appearance of nose and ears  Neck: JVD absent, carotid bruits absent  Lungs: respiratory sounds  normal, additional breath sounds absent  Heart: Regular rhythm,   No palpable thrills on palpation, murmurs absent, no rubs,   Lower extremity edema absent.   Pedal pulses normal  Abdomen: soft, non tender, non distended.  Extremities/MSK: no clubbing, no cyanosis  Neurological: normal orientation, Gait normal   Psychiatric: Appropriate affect, intact judgement and insight  Skin: Warm extremities        Lab Data Review:  Lab Results   Component Value Date/Time    CHOLSTRLTOT 200 (H) 12/06/2019 08:05 AM     (H) 12/06/2019 08:05 AM    HDL 47 12/06/2019 08:05 AM    TRIGLYCERIDE 106 12/06/2019 08:05 AM       Lab Results   Component Value Date/Time    SODIUM 140 12/06/2019 08:05 AM    POTASSIUM 4.1 12/06/2019 08:05 AM    CHLORIDE 114 (H) 12/06/2019 08:05 AM    CO2 22 12/06/2019 08:05 AM    GLUCOSE 91 12/06/2019 08:05 AM    BUN 15 12/06/2019 08:05 AM    CREATININE 0.97 12/06/2019 08:05 AM     Lab Results   Component Value Date/Time    ALKPHOSPHAT 48 12/06/2019 08:05 AM    ASTSGOT 21 12/06/2019 08:05 AM    ALTSGPT 20 12/06/2019 08:05 AM    TBILIRUBIN 0.3 12/06/2019 08:05 AM      Lab Results   Component Value " Date/Time    WBC 9.0 12/06/2019 08:05 AM     Impression and Plan:  44-year-old female patient with inappropriate sinus tachycardia, pregnancy.  Metoprolol can cause IUGR, fetal bradycardia especially in second and third trimester.  I would like to wean her off metoprolol slowly, recommend taking 50 mg for 2 weeks, then 25 mg for 2 weeks then 12.5 mg for 2 weeks.  Reassurance is provided that this condition does not complicate pregnancy.  However fall precautions, adequate hydration, compression socks are recommended.    Follow-up in 1 year    Ramírez PARKER  Interventional cardiologist  [facility], [address]  [address] [address]  Phone: [phone]  Fax: [phone]

## 2020-09-18 ENCOUNTER — PATIENT MESSAGE (OUTPATIENT)
Dept: CARDIOLOGY | Facility: MEDICAL CENTER | Age: 45
End: 2020-09-18

## 2020-09-18 RX ORDER — METOPROLOL SUCCINATE 25 MG/1
25 TABLET, EXTENDED RELEASE ORAL DAILY
Qty: 90 TAB | Refills: 0 | Status: SHIPPED | OUTPATIENT
Start: 2020-09-18 | End: 2020-11-20 | Stop reason: SDUPTHER

## 2020-09-19 NOTE — PATIENT COMMUNICATION
Per discussion with Dr. Sullivan, okay to continue to prescribe metoprolol succinate 25mg once daily (see mychart message).     Contacted pt and explained. Pt confirms that she has cut back to just 25mg once daily and is no longer taking the higher dose.     Prescription sent in for 90 days no refills. She will follow up with Bela WALLIS 11/20

## 2020-09-22 LAB
ABO GROUP BLD: NORMAL
HBV SURFACE AG SERPL QL IA: NORMAL
HIV 1+2 AB+HIV1 P24 AG SERPL QL IA: NORMAL
RH BLD: NORMAL
RUBV IGG SERPL IA-ACNC: NORMAL
TREPONEMA PALLIDUM IGG+IGM AB [PRESENCE] IN SERUM OR PLASMA BY IMMUNOASSAY: NORMAL

## 2020-11-20 ENCOUNTER — OFFICE VISIT (OUTPATIENT)
Dept: CARDIOLOGY | Facility: MEDICAL CENTER | Age: 45
End: 2020-11-20
Payer: COMMERCIAL

## 2020-11-20 VITALS
BODY MASS INDEX: 39.51 KG/M2 | HEART RATE: 102 BPM | SYSTOLIC BLOOD PRESSURE: 118 MMHG | HEIGHT: 63 IN | DIASTOLIC BLOOD PRESSURE: 74 MMHG | OXYGEN SATURATION: 98 % | WEIGHT: 223 LBS

## 2020-11-20 DIAGNOSIS — E66.3 OVERWEIGHT: ICD-10-CM

## 2020-11-20 DIAGNOSIS — R00.2 PALPITATIONS: ICD-10-CM

## 2020-11-20 DIAGNOSIS — R73.09 ELEVATED GLUCOSE: ICD-10-CM

## 2020-11-20 DIAGNOSIS — I47.11 INAPPROPRIATE SINUS TACHYCARDIA (HCC): ICD-10-CM

## 2020-11-20 PROCEDURE — 99214 OFFICE O/P EST MOD 30 MIN: CPT | Performed by: NURSE PRACTITIONER

## 2020-11-20 RX ORDER — BLOOD-GLUCOSE METER
EACH MISCELLANEOUS
COMMUNITY
Start: 2020-10-06 | End: 2021-05-25

## 2020-11-20 RX ORDER — LANCETS
EACH MISCELLANEOUS
COMMUNITY
Start: 2020-10-06 | End: 2021-05-25

## 2020-11-20 RX ORDER — BLOOD SUGAR DIAGNOSTIC
STRIP MISCELLANEOUS
COMMUNITY
Start: 2020-11-01 | End: 2021-05-25

## 2020-11-20 RX ORDER — METOPROLOL SUCCINATE 25 MG/1
25 TABLET, EXTENDED RELEASE ORAL DAILY
Qty: 90 TAB | Refills: 3 | Status: SHIPPED
Start: 2020-11-20 | End: 2021-03-05

## 2020-11-20 ASSESSMENT — ENCOUNTER SYMPTOMS
CHILLS: 0
PALPITATIONS: 1
ORTHOPNEA: 0
MYALGIAS: 0
PND: 0
NERVOUS/ANXIOUS: 0
DIZZINESS: 0
SHORTNESS OF BREATH: 1
HEADACHES: 1
FEVER: 0
COUGH: 0
ABDOMINAL PAIN: 0
CLAUDICATION: 0
DEPRESSION: 0

## 2020-11-20 ASSESSMENT — FIBROSIS 4 INDEX: FIB4 SCORE: 0.59

## 2020-11-20 NOTE — NON-PROVIDER
Chief Complaint   Patient presents with   • Tachycardia     Follow Up, Med Refills       HPI    Bela Gonzalez is a 44 y.o. female who presents today for follow up of inappropriate sinus tachycardia.     She was seen by Dr. Sullivan on 8/12/20 and instructed to decrease her metoprolol dose over 6 weeks from 100 mg daily to discontinue.    She is currently taking 25 mg daily and noticed that her palpitations and SOB have increased with symptoms usually only occurring in the evening and when she is climbing stairs. Resting HRs are 80s-90s, up to 120s-130s with activity and occasionally in the 140s.    She is currently 19 weeks pregnant. Per the patient her OB recommends that she continue with metoprolol as it is considered safe in pregnancy.        Review of Systems   Constitutional: Negative for chills, fever and malaise/fatigue.   HENT: Negative for congestion.    Respiratory: Positive for shortness of breath.         With activity   Cardiovascular: Positive for palpitations. Negative for chest pain, orthopnea and leg swelling.   Gastrointestinal: Negative for abdominal pain.   Musculoskeletal: Negative for myalgias.   Neurological: Positive for headaches. Negative for dizziness.        Occasional HA and hx of migraines present prior to pregnancy   Psychiatric/Behavioral: Negative for depression. The patient is not nervous/anxious.        Physical Exam   Constitutional: She is oriented to person, place, and time and well-developed, well-nourished, and in no distress.   HENT:   Head: Normocephalic and atraumatic.   Eyes: EOM are normal.   Neck: Normal range of motion. Neck supple.   Cardiovascular: Regular rhythm, S1 normal and S2 normal. Tachycardia present. Exam reveals no gallop.   No murmur heard.  Rate low 100s on exam, occasional extra beat   Musculoskeletal: Normal range of motion.         General: No edema.   Neurological: She is alert and oriented to person, place, and time. Gait normal.   Skin:  Skin is warm and dry.   Psychiatric: Mood, memory, affect and judgment normal.       ASSESSMENT    1. Inappropriate sinus tachycardia  2. Palpitations  3. Migraine headaches      PLAN    1. Inappropriate sinus tachycardia  -continue metoprolol at current dose 25 mg daily  -monitor HR at home, call if sustained  > 100 bpm at rest or symptomatic  -consider holter post pregnancy and follow up with EP if appropriate    2. Palpitations  -continue metoprolol  -monitor frequency and duration of symptoms  -can increase metoprolol dose if symptoms worsen    3. Migraine headaches  -off topomax for pregnancy  -monitor symptoms at home  -follow with PCP or OB as appropriate    Follow up with Dr. Wright in office in 6 months.

## 2021-03-02 ENCOUNTER — PATIENT MESSAGE (OUTPATIENT)
Dept: CARDIOLOGY | Facility: MEDICAL CENTER | Age: 46
End: 2021-03-02

## 2021-03-05 ENCOUNTER — OFFICE VISIT (OUTPATIENT)
Dept: CARDIOLOGY | Facility: MEDICAL CENTER | Age: 46
End: 2021-03-05
Payer: COMMERCIAL

## 2021-03-05 VITALS
OXYGEN SATURATION: 98 % | HEART RATE: 108 BPM | DIASTOLIC BLOOD PRESSURE: 88 MMHG | BODY MASS INDEX: 43.14 KG/M2 | SYSTOLIC BLOOD PRESSURE: 128 MMHG | HEIGHT: 63 IN | WEIGHT: 243.5 LBS | RESPIRATION RATE: 16 BRPM

## 2021-03-05 DIAGNOSIS — E66.3 OVERWEIGHT: ICD-10-CM

## 2021-03-05 DIAGNOSIS — I47.11 INAPPROPRIATE SINUS TACHYCARDIA (HCC): ICD-10-CM

## 2021-03-05 DIAGNOSIS — R00.2 PALPITATIONS: ICD-10-CM

## 2021-03-05 PROCEDURE — 99214 OFFICE O/P EST MOD 30 MIN: CPT | Performed by: NURSE PRACTITIONER

## 2021-03-05 RX ORDER — ESOMEPRAZOLE MAGNESIUM 20 MG/1
20 GRANULE, DELAYED RELEASE ORAL
COMMUNITY

## 2021-03-05 RX ORDER — PANTOPRAZOLE SODIUM 40 MG/1
TABLET, DELAYED RELEASE ORAL
COMMUNITY
Start: 2021-01-13 | End: 2021-05-25

## 2021-03-05 ASSESSMENT — ENCOUNTER SYMPTOMS
ABDOMINAL PAIN: 0
SHORTNESS OF BREATH: 1
NERVOUS/ANXIOUS: 0
PALPITATIONS: 1
PND: 0
CLAUDICATION: 0
FEVER: 0
COUGH: 0
ORTHOPNEA: 0
MYALGIAS: 0

## 2021-03-05 ASSESSMENT — FIBROSIS 4 INDEX: FIB4 SCORE: 0.61

## 2021-03-05 NOTE — NON-PROVIDER
Consultation    Today's Date: 3/5/2021  Team Member: Stefano Rao, Student    Reason for Visit: Follow-up Consultation  Chief Complaint:   Chief Complaint   Patient presents with   • Palpitations     F/V   • Tachycardia     F/V Dx:Inappropriate sinus tachycardia     History of Present Illness:   Bela Gonzalez, 46 yo female presents for follow up on recommendation from her OB regarding her inappropriate sinus tachycardia.      She is a patient of Dr. Sullivan.     Hx of palpitations with sinus tachycardia, gestational diabetes, and obesity.     The patient is , 35 weeks gestation.  She is currently on Metoprolol Succinate 25 mg QPM.  Patient states that she has been noticing that her HR has been elevated in the late afternoon (120's) and with exertion (150-160).  She denies fever, chest pain, dizziness, shortness of breath, wheezing, abd pain, n/v, and altered bowel or bladder habits.  She denies any spotting or abnormal bleeding.         She is doing well overall from an OB standpoint and being followed closely by her OB.      Past Medical History:   Diagnosis Date   • Diabetes     gestational   • Elevated WBC count    • Heart burn    • Inappropriate sinus node tachycardia    • Indigestion    • Migraine    • Pain 01/07/15    Back=chronic(scoliosis)>4/10   • Scoliosis        Past Surgical History:   Procedure Laterality Date   • BETH BY LAPAROSCOPY N/A 1/15/2015    Procedure: BETH BY LAPAROSCOPY;  Surgeon: Hima Joseph M.D.;  Location: SURGERY [facility];  Service:    • RECOVERY  2012    Performed by SURGERY, IR-RECOVERY at SURGERY SAME DAY [facility] ORS   • OTHER      wisdom teeth       Past Disease History:      Family History   Problem Relation Age of Onset   • Diabetes Mother    • Asthma Sister        Social History     Socioeconomic History   • Marital status:      Spouse name: Not on file   • Number of children: Not on file   • Years of education: Not on file   • Highest  "education level: Not on file   Occupational History   • Not on file   Tobacco Use   • Smoking status: Never Smoker   • Smokeless tobacco: Never Used   Substance and Sexual Activity   • Alcohol use: No   • Drug use: No     Comment: quit Meth 9 years ago   • Sexual activity: Not on file   Other Topics Concern   • Not on file   Social History Narrative   • Not on file     Social Determinants of Health     Financial Resource Strain:    • Difficulty of Paying Living Expenses:    Food Insecurity:    • Worried About Running Out of Food in the Last Year:    • Ran Out of Food in the Last Year:    Transportation Needs:    • Lack of Transportation (Medical):    • Lack of Transportation (Non-Medical):    Physical Activity:    • Days of Exercise per Week:    • Minutes of Exercise per Session:    Stress:    • Feeling of Stress :    Social Connections:    • Frequency of Communication with Friends and Family:    • Frequency of Social Gatherings with Friends and Family:    • Attends [employer] Services:    • Active Member of Clubs or Organizations:    • Attends Club or Organization Meetings:    • Marital Status:    Intimate Partner Violence:    • Fear of Current or Ex-Partner:    • Emotionally Abused:    • Physically Abused:    • Sexually Abused:        Review of Systems  Constitutional: Negative for fever and malaise/fatigue.   Respiratory: Shortness of breath noted with exertion.  Negative for cough and wheezing.    Cardiovascular: Tachycardia (HR monitor on smart watch) and palpitations. Negative for chest pain, orthopnea, claudication, edema, and PND.   Gastrointestinal: Negative for abdominal pain.   :  Negative for hematuria, dysuria, polyuria, vaginal discharge or bleeding.    Musculoskeletal: Negative for myalgias.   Neurological: Negative for dizziness.      BP (!) 88/64 (BP Location: Left arm, Patient Position: Sitting)   Pulse 94   Resp 14   Ht 1.803 m (5' 11\")   Wt 109 kg (239 lb 11.2 oz)   SpO2 95%   BMI 33.43 " "kg/m²     /88 (BP Location: Left arm, Patient Position: Sitting, BP Cuff Size: Adult)   Pulse (!) 108   Resp 16   Ht 1.6 m (5' 3\")   Wt 110 kg (243 lb 8 oz)   SpO2 98%   BMI 43.13 kg/m²       Current Outpatient Medications:   •  Esomeprazole Magnesium, Take 20 mg by mouth., Taking  •  metoprolol tartrate, 25 mg, Oral, BID  •  Accu-Chek Guide, , Taking  •  Accu-Chek Guide, , Taking  •  Accu-Chek FastClix Lancets, , Taking  •  Omega-3 Fatty Acids (FISH OIL PO), Take  by mouth., Taking  •  CALCIUM PO, Take  by mouth., Taking  •  diphenhydrAMINE HCl (BENADRYL ALLERGY PO), Take  by mouth at bedtime as needed., Taking  •  MULTIPLE VITAMIN PO, 1 tablet, Oral, DAILY, Taking  •  citalopram, 20 mg, Oral, DAILY, Taking    Allergies: Azithromycin    Physical Examination  Constitutional: She is oriented to person, place, and time.  Well-developed.   Head: Normocephalic and atraumatic.   Eyes: EOM are normal.   Neck: No JVD present.   Cardiovascular: Tachycardic @ 109 bpm, regular rhythm, normal heart sounds and intact distal pulses.   Pulmonary/Chest: Effort normal and breath sounds normal.    Musculoskeletal:  Normal ROM and function.    Abdominal: , 35 weeks gestation.  Pelvic exam denied.  Abdomen is soft.  BS normoactive in all 4 quadrants.    General:  Normal range of motion.     Assessment  1. Palpitations  2. Inappropriate sinus tachycardia  3. Overweight  4. Gestational Diabetes      Plan    1. Palpitations from inappropriate sinus tachycardia  - discontinue metoprolol succinate 25 mg XL QPM  - start metoprolol tartrate 25 mg - 1/2 tab (12.5 mg) po BID  - monitor BP readings at home.  Titrate metoprolol titrate up to a max dose of 25 mg po BID, if initial dosage isn't effective.    - call if palpitations, BP, or shortness of breath worsen  - EP consult post pregnancy for evaluation. 1 month post pregnancy.         2. Overweight in pregnancy  - discussion of lifestyle modifications  - cont with dietary " changes and OB visits     FU in clinic in 1 month post pregnancy (approx. 2 mos.) with EP; call if symptoms worsen for sooner apt     Patient verbalizes understanding and agrees with the plan of care.  Patient stated no further questions following our discussion today.            We appreciate the opportunity to assist in her care.

## 2021-03-06 NOTE — PROGRESS NOTES
Subjective:   Bela Gonzalez is a 39 y.o. female who presents today for 6 month follow up for inappropriate sinus tachycardia.    She is a patient of Dr. Sullivan.    Hx of palpitations with sinus tachycardia, gestational diabetes, and obesity.    She is , 35 weeks gestation. Plan for vaginal delivery 21 with Dr. Ba.    She continues with her metoprolol. She does feel more palpitations at rest and exertion now.    She is overall doing well from an OB standpoint. Hopeful for no complications with delivery, they are watching her closely with gestational diabetes, obesity in pregnancy, and palpitations on metoprolol.    She has fatigue and shortness of breath with exertion now.    Past Medical History:   Diagnosis Date   • Diabetes     gestational   • Elevated WBC count    • Heart burn    • Inappropriate sinus node tachycardia    • Indigestion    • Migraine    • Pain 01/07/15    Back=chronic(scoliosis)>4/10   • Scoliosis      Past Surgical History:   Procedure Laterality Date   • BETH BY LAPAROSCOPY N/A 1/15/2015    Procedure: BETH BY LAPAROSCOPY;  Surgeon: Hima Joseph M.D.;  Location: SURGERY [facility];  Service:    • RECOVERY  2012    Performed by SURGERY, IR-RECOVERY at SURGERY SAME DAY [facility] ORS   • OTHER      wisdom teeth     Family History   Problem Relation Age of Onset   • Diabetes Mother    • Asthma Sister      Social History     Tobacco Use   Smoking Status Never Smoker   Smokeless Tobacco Never Used     Allergies   Allergen Reactions   • Azithromycin      Z-pack- per pt causes severe diarrhea        Outpatient Encounter Medications as of 3/5/2021   Medication Sig Dispense Refill   • Esomeprazole Magnesium (NEXIUM) 20 MG Pack Take 20 mg by mouth.     • metoprolol tartrate (LOPRESSOR) 25 MG Tab Take 1 tablet by mouth 2 times a day. 60 tablet 11   • Blood Glucose Monitoring Suppl (ACCU-CHEK GUIDE) w/Device Kit      • ACCU-CHEK GUIDE strip      • Accu-Chek FastClix  "Lancets Misc      • Omega-3 Fatty Acids (FISH OIL PO) Take  by mouth.     • CALCIUM PO Take  by mouth.     • diphenhydrAMINE HCl (BENADRYL ALLERGY PO) Take  by mouth at bedtime as needed.     • MULTIPLE VITAMIN PO Take 1 Tab by mouth every day. Indications: Prenantal     • citalopram (CELEXA) 20 MG Tab Take 20 mg by mouth every day.     • [DISCONTINUED] metoprolol SR (TOPROL XL) 25 MG TABLET SR 24 HR Take 1 Tab by mouth every day. 90 Tab 3   • [DISCONTINUED] famotidine (PEPCID) 20 MG Tab Take 20 mg by mouth 2 times a day.     • [DISCONTINUED] clotrimazole (LOTRIMIN) 1 % Cream Apply a small amount to the affected area twice daily x 7 days. (Patient not taking: Reported on 3/5/2021) 1 Tube 0   • [DISCONTINUED] alprazolam (XANAX) 0.25 MG Tab Take 0.25 mg by mouth as needed. RARE USE       No facility-administered encounter medications on file as of 3/5/2021.     Review of Systems   Constitutional: Positive for malaise/fatigue. Negative for fever.        Pregnancy related    Respiratory: Positive for shortness of breath. Negative for cough.         REYEZ with pregnancy now   Cardiovascular: Positive for palpitations. Negative for chest pain, orthopnea, claudication, leg swelling and PND.        With exertion and rest; REYEZ with pregnancy now   Gastrointestinal: Negative for abdominal pain.   Musculoskeletal: Negative for myalgias.   Neurological: Weakness:    Psychiatric/Behavioral: The patient is not nervous/anxious.    All other systems reviewed and are negative.       Objective:   /88 (BP Location: Left arm, Patient Position: Sitting, BP Cuff Size: Adult)   Pulse (!) 108   Resp 16   Ht 1.6 m (5' 3\")   Wt 110 kg (243 lb 8 oz)   SpO2 98%   BMI 43.13 kg/m²     Physical Exam   Constitutional: She is oriented to person, place, and time. She appears well-developed. No distress.   Obesity in pregnancy      HENT:   Head: Normocephalic and atraumatic.   Eyes: EOM are normal.   Neck: No JVD present.   Cardiovascular: " Normal rate, regular rhythm, normal heart sounds and intact distal pulses.   No murmur heard.  Pulmonary/Chest: Effort normal and breath sounds normal. No respiratory distress.   Abdominal: Soft. Bowel sounds are normal.   19 weeks pregnant   Musculoskeletal:         General: No edema. Normal range of motion.      Cervical back: Normal range of motion.   Neurological: She is alert and oriented to person, place, and time.   Skin: Skin is warm and dry.   Psychiatric: She has a normal mood and affect.   Nursing note and vitals reviewed.    Assessment:     1. Inappropriate sinus tachycardia  REFERRAL TO CARDIOLOGY    THYROID PANEL WITH TSH    Comp Metabolic Panel   2. Palpitations     3. Overweight       Medical Decision Making:  Today's Assessment / Status / Plan:     1. Palpitations from inappropriate sinus tachycardia  -change to metoprolol tartrate 25 mg BID, follow BP and HR with this change  -call if palpitations or shortness of breath worsen  -echo in '10 unremarkable  -recommend EP consult post pregnancy for improvement in symptoms   -consider repeat monitor if warranted   -repeat labs soon    2. Overweight in pregnancy  -lifestyle modifications  -cont with dietary changes and OB visits    Patient is to follow up with Bela Fong and EP in 1-2 months with labs.

## 2021-03-20 LAB
ALBUMIN SERPL-MCNC: 3.4 G/DL (ref 3.8–4.8)
ALBUMIN/GLOB SERPL: 1.4 {RATIO} (ref 1.2–2.2)
ALP SERPL-CCNC: 116 IU/L (ref 39–117)
ALT SERPL-CCNC: 15 IU/L (ref 0–32)
AST SERPL-CCNC: 18 IU/L (ref 0–40)
BILIRUB SERPL-MCNC: <0.2 MG/DL (ref 0–1.2)
BUN SERPL-MCNC: 10 MG/DL (ref 6–24)
BUN/CREAT SERPL: 12 (ref 9–23)
CALCIUM SERPL-MCNC: 8.6 MG/DL (ref 8.7–10.2)
CHLORIDE SERPL-SCNC: 105 MMOL/L (ref 96–106)
CO2 SERPL-SCNC: 19 MMOL/L (ref 20–29)
CREAT SERPL-MCNC: 0.81 MG/DL (ref 0.57–1)
FT4I SERPL CALC-MCNC: 1.2 (ref 1.2–4.9)
GLOBULIN SER CALC-MCNC: 2.5 G/DL (ref 1.5–4.5)
GLUCOSE SERPL-MCNC: 77 MG/DL (ref 65–99)
POTASSIUM SERPL-SCNC: 4.2 MMOL/L (ref 3.5–5.2)
PROT SERPL-MCNC: 5.9 G/DL (ref 6–8.5)
SODIUM SERPL-SCNC: 139 MMOL/L (ref 134–144)
T3RU NFR SERPL: 13 % (ref 24–39)
T4 SERPL-MCNC: 9.5 UG/DL (ref 4.5–12)
TSH SERPL DL<=0.005 MIU/L-ACNC: 2.92 UIU/ML (ref 0.45–4.5)

## 2021-03-23 ENCOUNTER — PATIENT MESSAGE (OUTPATIENT)
Dept: CARDIOLOGY | Facility: MEDICAL CENTER | Age: 46
End: 2021-03-23

## 2021-03-25 ENCOUNTER — HOSPITAL ENCOUNTER (EMERGENCY)
Facility: MEDICAL CENTER | Age: 46
End: 2021-03-25
Attending: OBSTETRICS & GYNECOLOGY | Admitting: OBSTETRICS & GYNECOLOGY
Payer: COMMERCIAL

## 2021-03-25 VITALS
DIASTOLIC BLOOD PRESSURE: 78 MMHG | HEART RATE: 106 BPM | SYSTOLIC BLOOD PRESSURE: 122 MMHG | OXYGEN SATURATION: 98 % | WEIGHT: 248 LBS | BODY MASS INDEX: 43.94 KG/M2 | HEIGHT: 63 IN | TEMPERATURE: 97.4 F

## 2021-03-25 PROCEDURE — 59025 FETAL NON-STRESS TEST: CPT

## 2021-03-25 PROCEDURE — 99284 EMERGENCY DEPT VISIT MOD MDM: CPT

## 2021-03-25 ASSESSMENT — FIBROSIS 4 INDEX: FIB4 SCORE: 0.6

## 2021-03-25 NOTE — PROGRESS NOTES
46yo, , edc4/14, 37.1 presents with c/o decreased fm. Pt denies LOF, vag bleeding. POS fm though diminished. EFM and [place] placed. VSS. Pt is seen by [facility] for gest diabetes, Polyhydramnios and tachycardia. She was worked up a couple weeks ago for PIH which appears to have resolved. Pt states her baby is SGA in the 14 th percentile at her last visit at 5#7oz  Pt states that she was checked by Dr. Ba last week and was closed. Dr. Welsh updated.   1145 Dr. Welsh at bedside. Discussed POC with pt. Will eat lunch and notice if she is feeling baby move more. Would like a more reactive tracing before sending home. FOB, Jose Alfredo, went to cafeteria for food.   1220 Food arrived. Pt eating. Reports feeling baby move more.   1310 Dr. Welsh at bedside. Pt reports feeling baby move a lot. Difficult to monitor due to fetal movement. Dr. Welsh reassured by this. Okay to discharge to home.   1320  Discharge instructions given, pt states understanding. Reactive strip obtained. Pt discharged to home  in stable condition, ambulatory, with FOB.

## 2021-03-25 NOTE — H&P
History and physical     March 25, 2021     Chief complaint:   Patient presents decreased fetal movement    History of present illness: Patient is a 45-year-old female para 1 presenting at 37-1/7 weeks with decreased fetal movement.  She reported normal fetal movement yesterday but movement has been somewhat diminished today so she presented for evaluation    Medical history: Stational diet-controlled diabetes, obesity, depression, migraines, ventricular tachycardia    Surgical history: Cholecystectomy    Habits: Patient denies tobacco alcohol or drug use    Allergies: No known drug allergies    Medications: Metoprolol tartrate 5 mg twice a day    Obstetric history: Patient is Rh+, rubella immune, group B strep negative    Family history: NoncontributoryTo current problem    Review of systems: Negative 9 point review of systems    Physical exam  Vitals: Normal  General: Patient is awake alert pleasant  Head: Atraumatic normocephalic  Abdomen gravid: Tender  Pelvic: Deferred  Extremities: Normal    Assessment:  1-37-1/7-week intrauterine pregnancy  2-decreased fetal movement  3-diet-controlled gestational diabetes  4-intermittent supraventricular tachycardia    NST category 1      Plan:  Patient not eat was given lunch.  Baby has been extremely active since eating.  The nurses had difficult monitoring the baby because the baby is so active.  Patient states baby is extremely active.  Tracing was category 1.  Fetal movement precautions reviewed.  She was discharged home

## 2021-03-27 ENCOUNTER — ANESTHESIA (OUTPATIENT)
Dept: ANESTHESIOLOGY | Facility: MEDICAL CENTER | Age: 46
End: 2021-03-27
Payer: COMMERCIAL

## 2021-03-27 ENCOUNTER — HOSPITAL ENCOUNTER (INPATIENT)
Facility: MEDICAL CENTER | Age: 46
LOS: 2 days | End: 2021-03-29
Attending: OBSTETRICS & GYNECOLOGY | Admitting: OBSTETRICS & GYNECOLOGY
Payer: COMMERCIAL

## 2021-03-27 ENCOUNTER — ANESTHESIA EVENT (OUTPATIENT)
Dept: ANESTHESIOLOGY | Facility: MEDICAL CENTER | Age: 46
End: 2021-03-27
Payer: COMMERCIAL

## 2021-03-27 LAB
ALBUMIN SERPL BCP-MCNC: 3.2 G/DL (ref 3.2–4.9)
ALBUMIN/GLOB SERPL: 1 G/DL
ALP SERPL-CCNC: 125 U/L (ref 30–99)
ALT SERPL-CCNC: 13 U/L (ref 2–50)
ANION GAP SERPL CALC-SCNC: 11 MMOL/L (ref 7–16)
AST SERPL-CCNC: 17 U/L (ref 12–45)
BASOPHILS # BLD AUTO: 0.2 % (ref 0–1.8)
BASOPHILS # BLD: 0.03 K/UL (ref 0–0.12)
BILIRUB SERPL-MCNC: <0.2 MG/DL (ref 0.1–1.5)
BUN SERPL-MCNC: 13 MG/DL (ref 8–22)
CALCIUM SERPL-MCNC: 9.1 MG/DL (ref 8.5–10.5)
CHLORIDE SERPL-SCNC: 102 MMOL/L (ref 96–112)
CO2 SERPL-SCNC: 19 MMOL/L (ref 20–33)
CREAT SERPL-MCNC: 0.69 MG/DL (ref 0.5–1.4)
CREAT UR-MCNC: 142.22 MG/DL
EOSINOPHIL # BLD AUTO: 0.03 K/UL (ref 0–0.51)
EOSINOPHIL NFR BLD: 0.2 % (ref 0–6.9)
ERYTHROCYTE [DISTWIDTH] IN BLOOD BY AUTOMATED COUNT: 46.4 FL (ref 35.9–50)
GLOBULIN SER CALC-MCNC: 3.3 G/DL (ref 1.9–3.5)
GLUCOSE BLD-MCNC: 72 MG/DL (ref 65–99)
GLUCOSE BLD-MCNC: 74 MG/DL (ref 65–99)
GLUCOSE SERPL-MCNC: 105 MG/DL (ref 65–99)
HCT VFR BLD AUTO: 35.7 % (ref 37–47)
HGB BLD-MCNC: 11.3 G/DL (ref 12–16)
HOLDING TUBE BB 8507: NORMAL
IMM GRANULOCYTES # BLD AUTO: 0.08 K/UL (ref 0–0.11)
IMM GRANULOCYTES NFR BLD AUTO: 0.5 % (ref 0–0.9)
LYMPHOCYTES # BLD AUTO: 2.52 K/UL (ref 1–4.8)
LYMPHOCYTES NFR BLD: 16.5 % (ref 22–41)
MCH RBC QN AUTO: 27.8 PG (ref 27–33)
MCHC RBC AUTO-ENTMCNC: 31.7 G/DL (ref 33.6–35)
MCV RBC AUTO: 87.9 FL (ref 81.4–97.8)
MONOCYTES # BLD AUTO: 1.08 K/UL (ref 0–0.85)
MONOCYTES NFR BLD AUTO: 7.1 % (ref 0–13.4)
NEUTROPHILS # BLD AUTO: 11.52 K/UL (ref 2–7.15)
NEUTROPHILS NFR BLD: 75.5 % (ref 44–72)
NRBC # BLD AUTO: 0 K/UL
NRBC BLD-RTO: 0 /100 WBC
PLATELET # BLD AUTO: 358 K/UL (ref 164–446)
PMV BLD AUTO: 10 FL (ref 9–12.9)
POTASSIUM SERPL-SCNC: 4.3 MMOL/L (ref 3.6–5.5)
PROT SERPL-MCNC: 6.5 G/DL (ref 6–8.2)
PROT UR-MCNC: 18 MG/DL (ref 0–15)
PROT/CREAT UR: 127 MG/G (ref 10–107)
RBC # BLD AUTO: 4.06 M/UL (ref 4.2–5.4)
SARS-COV+SARS-COV-2 AG RESP QL IA.RAPID: NOTDETECTED
SARS-COV-2 RNA RESP QL NAA+PROBE: NOTDETECTED
SODIUM SERPL-SCNC: 132 MMOL/L (ref 135–145)
SPECIMEN SOURCE: NORMAL
SPECIMEN SOURCE: NORMAL
URATE SERPL-MCNC: 3.6 MG/DL (ref 1.9–8.2)
WBC # BLD AUTO: 15.3 K/UL (ref 4.8–10.8)

## 2021-03-27 PROCEDURE — 10H07YZ INSERTION OF OTHER DEVICE INTO PRODUCTS OF CONCEPTION, VIA NATURAL OR ARTIFICIAL OPENING: ICD-10-PCS | Performed by: OBSTETRICS & GYNECOLOGY

## 2021-03-27 PROCEDURE — 700111 HCHG RX REV CODE 636 W/ 250 OVERRIDE (IP): Performed by: OBSTETRICS & GYNECOLOGY

## 2021-03-27 PROCEDURE — 36415 COLL VENOUS BLD VENIPUNCTURE: CPT

## 2021-03-27 PROCEDURE — 700111 HCHG RX REV CODE 636 W/ 250 OVERRIDE (IP)

## 2021-03-27 PROCEDURE — 700105 HCHG RX REV CODE 258: Performed by: ANESTHESIOLOGY

## 2021-03-27 PROCEDURE — 700101 HCHG RX REV CODE 250: Performed by: ANESTHESIOLOGY

## 2021-03-27 PROCEDURE — 303615 HCHG EPIDURAL/SPINAL ANESTHESIA FOR LABOR

## 2021-03-27 PROCEDURE — 84156 ASSAY OF PROTEIN URINE: CPT

## 2021-03-27 PROCEDURE — 84550 ASSAY OF BLOOD/URIC ACID: CPT

## 2021-03-27 PROCEDURE — 770002 HCHG ROOM/CARE - OB PRIVATE (112)

## 2021-03-27 PROCEDURE — 82570 ASSAY OF URINE CREATININE: CPT

## 2021-03-27 PROCEDURE — 82962 GLUCOSE BLOOD TEST: CPT

## 2021-03-27 PROCEDURE — 80053 COMPREHEN METABOLIC PANEL: CPT

## 2021-03-27 PROCEDURE — 304965 HCHG RECOVERY SERVICES

## 2021-03-27 PROCEDURE — 700102 HCHG RX REV CODE 250 W/ 637 OVERRIDE(OP): Performed by: OBSTETRICS & GYNECOLOGY

## 2021-03-27 PROCEDURE — 59409 OBSTETRICAL CARE: CPT

## 2021-03-27 PROCEDURE — U0003 INFECTIOUS AGENT DETECTION BY NUCLEIC ACID (DNA OR RNA); SEVERE ACUTE RESPIRATORY SYNDROME CORONAVIRUS 2 (SARS-COV-2) (CORONAVIRUS DISEASE [COVID-19]), AMPLIFIED PROBE TECHNIQUE, MAKING USE OF HIGH THROUGHPUT TECHNOLOGIES AS DESCRIBED BY CMS-2020-01-R: HCPCS

## 2021-03-27 PROCEDURE — U0005 INFEC AGEN DETEC AMPLI PROBE: HCPCS

## 2021-03-27 PROCEDURE — 0KQM0ZZ REPAIR PERINEUM MUSCLE, OPEN APPROACH: ICD-10-PCS | Performed by: OBSTETRICS & GYNECOLOGY

## 2021-03-27 PROCEDURE — 700105 HCHG RX REV CODE 258: Performed by: OBSTETRICS & GYNECOLOGY

## 2021-03-27 PROCEDURE — A9270 NON-COVERED ITEM OR SERVICE: HCPCS | Performed by: OBSTETRICS & GYNECOLOGY

## 2021-03-27 PROCEDURE — 700111 HCHG RX REV CODE 636 W/ 250 OVERRIDE (IP): Performed by: ANESTHESIOLOGY

## 2021-03-27 PROCEDURE — 85025 COMPLETE CBC W/AUTO DIFF WBC: CPT

## 2021-03-27 PROCEDURE — 87426 SARSCOV CORONAVIRUS AG IA: CPT

## 2021-03-27 RX ORDER — OXYCODONE AND ACETAMINOPHEN 10; 325 MG/1; MG/1
1 TABLET ORAL EVERY 4 HOURS PRN
Status: DISCONTINUED | OUTPATIENT
Start: 2021-03-27 | End: 2021-03-29 | Stop reason: HOSPADM

## 2021-03-27 RX ORDER — ROPIVACAINE HYDROCHLORIDE 2 MG/ML
INJECTION, SOLUTION EPIDURAL; INFILTRATION; PERINEURAL CONTINUOUS
Status: DISCONTINUED | OUTPATIENT
Start: 2021-03-27 | End: 2021-03-28 | Stop reason: HOSPADM

## 2021-03-27 RX ORDER — CARBOPROST TROMETHAMINE 250 UG/ML
250 INJECTION, SOLUTION INTRAMUSCULAR
Status: DISCONTINUED | OUTPATIENT
Start: 2021-03-27 | End: 2021-03-27 | Stop reason: HOSPADM

## 2021-03-27 RX ORDER — LORATADINE 10 MG/1
10 TABLET ORAL DAILY
COMMUNITY

## 2021-03-27 RX ORDER — SODIUM CHLORIDE, SODIUM LACTATE, POTASSIUM CHLORIDE, AND CALCIUM CHLORIDE .6; .31; .03; .02 G/100ML; G/100ML; G/100ML; G/100ML
1000 INJECTION, SOLUTION INTRAVENOUS
Status: COMPLETED | OUTPATIENT
Start: 2021-03-27 | End: 2021-03-27

## 2021-03-27 RX ORDER — SODIUM CHLORIDE, SODIUM LACTATE, POTASSIUM CHLORIDE, CALCIUM CHLORIDE 600; 310; 30; 20 MG/100ML; MG/100ML; MG/100ML; MG/100ML
INJECTION, SOLUTION INTRAVENOUS CONTINUOUS
Status: DISCONTINUED | OUTPATIENT
Start: 2021-03-27 | End: 2021-03-29 | Stop reason: HOSPADM

## 2021-03-27 RX ORDER — SODIUM CHLORIDE, SODIUM LACTATE, POTASSIUM CHLORIDE, AND CALCIUM CHLORIDE .6; .31; .03; .02 G/100ML; G/100ML; G/100ML; G/100ML
250 INJECTION, SOLUTION INTRAVENOUS PRN
Status: DISCONTINUED | OUTPATIENT
Start: 2021-03-27 | End: 2021-03-27 | Stop reason: HOSPADM

## 2021-03-27 RX ORDER — LIDOCAINE HYDROCHLORIDE AND EPINEPHRINE 15; 5 MG/ML; UG/ML
INJECTION, SOLUTION EPIDURAL
Status: COMPLETED | OUTPATIENT
Start: 2021-03-27 | End: 2021-03-27

## 2021-03-27 RX ORDER — CITALOPRAM 20 MG/1
20 TABLET ORAL DAILY
Status: DISCONTINUED | OUTPATIENT
Start: 2021-03-27 | End: 2021-03-27 | Stop reason: HOSPADM

## 2021-03-27 RX ORDER — OXYCODONE HYDROCHLORIDE AND ACETAMINOPHEN 5; 325 MG/1; MG/1
1 TABLET ORAL EVERY 4 HOURS PRN
Status: DISCONTINUED | OUTPATIENT
Start: 2021-03-27 | End: 2021-03-29 | Stop reason: HOSPADM

## 2021-03-27 RX ORDER — ROPIVACAINE HYDROCHLORIDE 2 MG/ML
INJECTION, SOLUTION EPIDURAL; INFILTRATION; PERINEURAL
Status: COMPLETED
Start: 2021-03-27 | End: 2021-03-27

## 2021-03-27 RX ORDER — ACETAMINOPHEN 325 MG/1
325 TABLET ORAL EVERY 4 HOURS PRN
Status: DISCONTINUED | OUTPATIENT
Start: 2021-03-27 | End: 2021-03-29 | Stop reason: HOSPADM

## 2021-03-27 RX ORDER — MISOPROSTOL 200 UG/1
800 TABLET ORAL
Status: DISCONTINUED | OUTPATIENT
Start: 2021-03-27 | End: 2021-03-27 | Stop reason: HOSPADM

## 2021-03-27 RX ORDER — CITALOPRAM 20 MG/1
20 TABLET ORAL DAILY
Status: DISCONTINUED | OUTPATIENT
Start: 2021-03-28 | End: 2021-03-27

## 2021-03-27 RX ORDER — IBUPROFEN 600 MG/1
600 TABLET ORAL EVERY 6 HOURS PRN
Status: DISCONTINUED | OUTPATIENT
Start: 2021-03-27 | End: 2021-03-29 | Stop reason: HOSPADM

## 2021-03-27 RX ORDER — ASPIRIN 81 MG/1
81 TABLET, CHEWABLE ORAL DAILY
COMMUNITY
End: 2021-05-25

## 2021-03-27 RX ORDER — METHYLERGONOVINE MALEATE 0.2 MG/ML
0.2 INJECTION INTRAVENOUS
Status: DISCONTINUED | OUTPATIENT
Start: 2021-03-27 | End: 2021-03-27 | Stop reason: HOSPADM

## 2021-03-27 RX ADMIN — CITALOPRAM HYDROBROMIDE 20 MG: 20 TABLET ORAL at 23:04

## 2021-03-27 RX ADMIN — OXYTOCIN 2000 ML/HR: 10 INJECTION, SOLUTION INTRAMUSCULAR; INTRAVENOUS at 20:15

## 2021-03-27 RX ADMIN — SODIUM CHLORIDE, POTASSIUM CHLORIDE, SODIUM LACTATE AND CALCIUM CHLORIDE 1000 ML: 600; 310; 30; 20 INJECTION, SOLUTION INTRAVENOUS at 15:45

## 2021-03-27 RX ADMIN — LIDOCAINE HYDROCHLORIDE,EPINEPHRINE BITARTRATE 3 ML: 15; .005 INJECTION, SOLUTION EPIDURAL; INFILTRATION; INTRACAUDAL; PERINEURAL at 16:24

## 2021-03-27 RX ADMIN — SODIUM CHLORIDE, POTASSIUM CHLORIDE, SODIUM LACTATE AND CALCIUM CHLORIDE: 600; 310; 30; 20 INJECTION, SOLUTION INTRAVENOUS at 17:17

## 2021-03-27 RX ADMIN — SODIUM CHLORIDE, POTASSIUM CHLORIDE, SODIUM LACTATE AND CALCIUM CHLORIDE: 600; 310; 30; 20 INJECTION, SOLUTION INTRAVENOUS at 16:54

## 2021-03-27 RX ADMIN — SODIUM CHLORIDE, POTASSIUM CHLORIDE, SODIUM LACTATE AND CALCIUM CHLORIDE: 600; 310; 30; 20 INJECTION, SOLUTION INTRAVENOUS at 16:34

## 2021-03-27 RX ADMIN — OXYTOCIN 125 ML/HR: 10 INJECTION, SOLUTION INTRAMUSCULAR; INTRAVENOUS at 21:05

## 2021-03-27 RX ADMIN — METOPROLOL TARTRATE 25 MG: 25 TABLET, FILM COATED ORAL at 21:40

## 2021-03-27 RX ADMIN — ROPIVACAINE HYDROCHLORIDE: 2 INJECTION, SOLUTION EPIDURAL; INFILTRATION at 16:32

## 2021-03-27 RX ADMIN — ROPIVACAINE HYDROCHLORIDE: 2 INJECTION, SOLUTION EPIDURAL; INFILTRATION; PERINEURAL at 16:32

## 2021-03-27 RX ADMIN — BUPIVACAINE HYDROCHLORIDE 10 ML: 2.5 INJECTION, SOLUTION EPIDURAL; INFILTRATION; INTRACAUDAL; PERINEURAL at 16:24

## 2021-03-27 ASSESSMENT — LIFESTYLE VARIABLES
HAVE PEOPLE ANNOYED YOU BY CRITICIZING YOUR DRINKING: NO
HAVE YOU EVER FELT YOU SHOULD CUT DOWN ON YOUR DRINKING: NO
CONSUMPTION TOTAL: NEGATIVE
AVERAGE NUMBER OF DAYS PER WEEK YOU HAVE A DRINK CONTAINING ALCOHOL: 0
HOW MANY TIMES IN THE PAST YEAR HAVE YOU HAD 5 OR MORE DRINKS IN A DAY: 0
EVER_SMOKED: NEVER
TOTAL SCORE: 0
ON A TYPICAL DAY WHEN YOU DRINK ALCOHOL HOW MANY DRINKS DO YOU HAVE: 0
EVER FELT BAD OR GUILTY ABOUT YOUR DRINKING: NO
TOTAL SCORE: 0
ALCOHOL_USE: NO
EVER HAD A DRINK FIRST THING IN THE MORNING TO STEADY YOUR NERVES TO GET RID OF A HANGOVER: NO
TOTAL SCORE: 0

## 2021-03-27 ASSESSMENT — PATIENT HEALTH QUESTIONNAIRE - PHQ9
SUM OF ALL RESPONSES TO PHQ9 QUESTIONS 1 AND 2: 0
1. LITTLE INTEREST OR PLEASURE IN DOING THINGS: NOT AT ALL
2. FEELING DOWN, DEPRESSED, IRRITABLE, OR HOPELESS: NOT AT ALL

## 2021-03-27 ASSESSMENT — FIBROSIS 4 INDEX: FIB4 SCORE: 0.6

## 2021-03-27 ASSESSMENT — PAIN SCALES - GENERAL: PAIN_LEVEL: 0

## 2021-03-27 ASSESSMENT — COPD QUESTIONNAIRES
DURING THE PAST 4 WEEKS HOW MUCH DID YOU FEEL SHORT OF BREATH: NONE/LITTLE OF THE TIME
DO YOU EVER COUGH UP ANY MUCUS OR PHLEGM?: NO/ONLY WITH OCCASIONAL COLDS OR INFECTIONS
HAVE YOU SMOKED AT LEAST 100 CIGARETTES IN YOUR ENTIRE LIFE: NO/DON'T KNOW
IN THE PAST 12 MONTHS DO YOU DO LESS THAN YOU USED TO BECAUSE OF YOUR BREATHING PROBLEMS: DISAGREE/UNSURE
COPD SCREENING SCORE: 0

## 2021-03-27 NOTE — ANESTHESIA PROCEDURE NOTES
Epidural Block    Date/Time: 3/27/2021 4:24 PM  Performed by: Michell Xiao M.D.  Authorized by: Michell Xiao M.D.     Patient Location:  OB  Start Time:  3/27/2021 4:24 PM  Reason for Block: labor analgesia    patient identified, IV checked, site marked, risks and benefits discussed, surgical consent, monitors and equipment checked, pre-op evaluation and timeout performed    Patient Position:  Sitting  Prep: ChloraPrep, patient draped and sterile technique    Monitoring:  Blood pressure, continuous pulse oximetry and heart rate  Approach:  Midline  Location:  L3-L4  Injection Technique:  LEVON saline  Skin infiltration:  Lidocaine  Strength:  1%  Dose:  3ml  Needle Type:  Tuohy  Needle Gauge:  17 G  Needle Length:  3.5 in  Loss of resistance::  6  Catheter Size:  19 G  Catheter at Skin Depth:  11  Test Dose Result:  Negative

## 2021-03-27 NOTE — H&P
"Labor and Delivery History and Physical    CC: contractions, leaking fluid    HPI:Bela Gonzalez is a 46 yo  who presented at 37w3d with complaints of contractions. While in L&D she report acute loss of fluid and was noted to be grossly ruptured.     She received her prenatal care with Dr Ba this pregnancy. It was complicated by CHTN, prediabetes and then A1DM and AMA. She was followed by [facility]. She had an anatomic survey completed in her second trimester which was within normal limits. 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     All other systems were reviewed and were negative.    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, denies abnormal pap smears, last pap was NILM HPV(-) in 2017 per patient report  SH: denies T/E/D  Meds:PNV, metoprolol, pepcid, benadryl, celexa, loratadine, PNV  Allergies:azithromycin    /76   Pulse (!) 133   Temp 37 °C (98.6 °F) (Temporal)   Resp 16   Ht 1.6 m (5' 3\")   Wt 112 kg (248 lb)   BMI 43.93 kg/m²     Physical Exam  Gen: uncomfortable with contractions  Abd: gravid, non tender  Ext: no edema    FHT:140's, moderate variability, no accelerations, decelerations present but difficult to characterize as no visible contractions  [place]:unable to demonstrate on toco  SVE:3//-2, gross rupture clear fluid    Laboratory Evaluation  ABO A pos  GBS neg  GTT:early one hr 181  Rubella: Immune  HIV: Neg  RPR: NR  Hep sAg: neg    Assessment:   1. Term IUP at 37w3d  2. SROM  3. AMA  4. CHTN  5. A1DM    Plan: Admit to L&D for augmentation of labor. May have epidural on demand. Anticipate vaginal delivery.     Neris Garcia M.D.      "

## 2021-03-27 NOTE — PROGRESS NOTES
1500- Received report from MYA Cano RN.  Pt moved to S213 via gurney in stable condition with FOB at side.  1618- Dr. Xiao at bs.  Epidural placed, pt tolerated well.  1637- Pt with near syncopal episode post epidural.  PD noted, IV fluid bolus infusing, pt repositioned to far left then far right side.  SVE- 4-5 cm, FSE placed.  BP low for pt, tachycardia in the 120's (normal for pt).  Dr. Xiao notified, orders received for continued IV fluid bolus.  1700- Pt still feeling light headed.  Epidural infusion turned off.  Dr. Xiao notified via phone.  FHR tracing reviewed by Dr. Garcia.  1711- Dr. Garcia at bs.  SVE- 6-7/80/-1.  IUPC placed.    1730- Pt feeling increased lightheadedness.  Dr. Garcia at bs.  Pt placed in Trendelenburg.  Pt reports some improvement in lightheadedness in this position, VSS.  1800- Report to MYA Carey RN.

## 2021-03-27 NOTE — ANESTHESIA PREPROCEDURE EVALUATION
Relevant Problems   CARDIAC   (+) Inappropriate sinus tachycardia       Physical Exam    Airway   Mallampati: II  TM distance: >3 FB  Neck ROM: full       Cardiovascular - normal exam     Dental - normal exam           Pulmonary   Breath sounds clear to auscultation     Abdominal   (+) obese     Neurological - normal exam                 Anesthesia Plan    ASA 3   ASA physical status 3 criteria: morbid obesity - BMI greater than or equal to 40    Plan - epidural   Neuraxial block will be labor analgesia                  Pertinent diagnostic labs and testing reviewed    Informed Consent:    Anesthetic plan and risks discussed with patient.

## 2021-03-28 LAB
ERYTHROCYTE [DISTWIDTH] IN BLOOD BY AUTOMATED COUNT: 48 FL (ref 35.9–50)
HCT VFR BLD AUTO: 30.5 % (ref 37–47)
HGB BLD-MCNC: 9.4 G/DL (ref 12–16)
MCH RBC QN AUTO: 27.6 PG (ref 27–33)
MCHC RBC AUTO-ENTMCNC: 30.8 G/DL (ref 33.6–35)
MCV RBC AUTO: 89.7 FL (ref 81.4–97.8)
PLATELET # BLD AUTO: 275 K/UL (ref 164–446)
PMV BLD AUTO: 9.8 FL (ref 9–12.9)
RBC # BLD AUTO: 3.4 M/UL (ref 4.2–5.4)
WBC # BLD AUTO: 18.1 K/UL (ref 4.8–10.8)

## 2021-03-28 PROCEDURE — 36415 COLL VENOUS BLD VENIPUNCTURE: CPT

## 2021-03-28 PROCEDURE — 85027 COMPLETE CBC AUTOMATED: CPT

## 2021-03-28 PROCEDURE — 770002 HCHG ROOM/CARE - OB PRIVATE (112)

## 2021-03-28 PROCEDURE — A9270 NON-COVERED ITEM OR SERVICE: HCPCS | Performed by: OBSTETRICS & GYNECOLOGY

## 2021-03-28 PROCEDURE — 700102 HCHG RX REV CODE 250 W/ 637 OVERRIDE(OP): Performed by: OBSTETRICS & GYNECOLOGY

## 2021-03-28 RX ORDER — SODIUM CHLORIDE, SODIUM LACTATE, POTASSIUM CHLORIDE, CALCIUM CHLORIDE 600; 310; 30; 20 MG/100ML; MG/100ML; MG/100ML; MG/100ML
INJECTION, SOLUTION INTRAVENOUS PRN
Status: DISCONTINUED | OUTPATIENT
Start: 2021-03-28 | End: 2021-03-29 | Stop reason: HOSPADM

## 2021-03-28 RX ORDER — CITALOPRAM 20 MG/1
20 TABLET ORAL
Status: DISCONTINUED | OUTPATIENT
Start: 2021-03-28 | End: 2021-03-29 | Stop reason: HOSPADM

## 2021-03-28 RX ORDER — METHYLERGONOVINE MALEATE 0.2 MG/ML
0.2 INJECTION INTRAVENOUS
Status: DISCONTINUED | OUTPATIENT
Start: 2021-03-28 | End: 2021-03-29 | Stop reason: HOSPADM

## 2021-03-28 RX ORDER — FERROUS SULFATE 325(65) MG
325 TABLET ORAL
Status: DISCONTINUED | OUTPATIENT
Start: 2021-03-28 | End: 2021-03-29 | Stop reason: HOSPADM

## 2021-03-28 RX ORDER — DOCUSATE SODIUM 100 MG/1
100 CAPSULE, LIQUID FILLED ORAL 2 TIMES DAILY PRN
Status: DISCONTINUED | OUTPATIENT
Start: 2021-03-28 | End: 2021-03-29 | Stop reason: HOSPADM

## 2021-03-28 RX ORDER — CARBOPROST TROMETHAMINE 250 UG/ML
250 INJECTION, SOLUTION INTRAMUSCULAR
Status: DISCONTINUED | OUTPATIENT
Start: 2021-03-28 | End: 2021-03-29 | Stop reason: HOSPADM

## 2021-03-28 RX ORDER — VITAMIN A ACETATE, BETA CAROTENE, ASCORBIC ACID, CHOLECALCIFEROL, .ALPHA.-TOCOPHEROL ACETATE, DL-, THIAMINE MONONITRATE, RIBOFLAVIN, NIACINAMIDE, PYRIDOXINE HYDROCHLORIDE, FOLIC ACID, CYANOCOBALAMIN, CALCIUM CARBONATE, FERROUS FUMARATE, ZINC OXIDE, CUPRIC OXIDE 3080; 12; 120; 400; 1; 1.84; 3; 20; 22; 920; 25; 200; 27; 10; 2 [IU]/1; UG/1; MG/1; [IU]/1; MG/1; MG/1; MG/1; MG/1; MG/1; [IU]/1; MG/1; MG/1; MG/1; MG/1; MG/1
1 TABLET, FILM COATED ORAL EVERY MORNING
Status: DISCONTINUED | OUTPATIENT
Start: 2021-03-28 | End: 2021-03-29 | Stop reason: HOSPADM

## 2021-03-28 RX ORDER — IBUPROFEN 600 MG/1
600 TABLET ORAL EVERY 6 HOURS PRN
Status: DISCONTINUED | OUTPATIENT
Start: 2021-03-28 | End: 2021-03-29 | Stop reason: HOSPADM

## 2021-03-28 RX ORDER — BISACODYL 10 MG
10 SUPPOSITORY, RECTAL RECTAL PRN
Status: DISCONTINUED | OUTPATIENT
Start: 2021-03-28 | End: 2021-03-29 | Stop reason: HOSPADM

## 2021-03-28 RX ORDER — MISOPROSTOL 200 UG/1
600 TABLET ORAL
Status: DISCONTINUED | OUTPATIENT
Start: 2021-03-28 | End: 2021-03-29 | Stop reason: HOSPADM

## 2021-03-28 RX ADMIN — IBUPROFEN 600 MG: 600 TABLET, FILM COATED ORAL at 09:14

## 2021-03-28 RX ADMIN — IBUPROFEN 600 MG: 600 TABLET, FILM COATED ORAL at 01:17

## 2021-03-28 RX ADMIN — FERROUS SULFATE TAB 325 MG (65 MG ELEMENTAL FE) 325 MG: 325 (65 FE) TAB at 09:13

## 2021-03-28 RX ADMIN — CITALOPRAM HYDROBROMIDE 20 MG: 20 TABLET ORAL at 21:29

## 2021-03-28 RX ADMIN — METOPROLOL TARTRATE 25 MG: 25 TABLET, FILM COATED ORAL at 10:18

## 2021-03-28 RX ADMIN — IBUPROFEN 600 MG: 600 TABLET, FILM COATED ORAL at 15:29

## 2021-03-28 RX ADMIN — PRENATAL WITH FERROUS FUM AND FOLIC ACID 1 TABLET: 3080; 920; 120; 400; 22; 1.84; 3; 20; 10; 1; 12; 200; 27; 25; 2 TABLET ORAL at 09:14

## 2021-03-28 RX ADMIN — METOPROLOL TARTRATE 25 MG: 25 TABLET, FILM COATED ORAL at 18:10

## 2021-03-28 RX ADMIN — IBUPROFEN 600 MG: 600 TABLET, FILM COATED ORAL at 21:29

## 2021-03-28 ASSESSMENT — EDINBURGH POSTNATAL DEPRESSION SCALE (EPDS)
I HAVE FELT SCARED OR PANICKY FOR NO GOOD REASON: NO, NOT MUCH
I HAVE BEEN SO UNHAPPY THAT I HAVE BEEN CRYING: NO, NEVER
THE THOUGHT OF HARMING MYSELF HAS OCCURRED TO ME: NEVER
I HAVE LOOKED FORWARD WITH ENJOYMENT TO THINGS: AS MUCH AS I EVER DID
THINGS HAVE BEEN GETTING ON TOP OF ME: NO, MOST OF THE TIME I HAVE COPED QUITE WELL
I HAVE BEEN ANXIOUS OR WORRIED FOR NO GOOD REASON: YES, SOMETIMES
I HAVE FELT SAD OR MISERABLE: NO, NOT AT ALL
I HAVE BEEN SO UNHAPPY THAT I HAVE HAD DIFFICULTY SLEEPING: NOT AT ALL
I HAVE BEEN ABLE TO LAUGH AND SEE THE FUNNY SIDE OF THINGS: AS MUCH AS I ALWAYS COULD
I HAVE BLAMED MYSELF UNNECESSARILY WHEN THINGS WENT WRONG: YES, SOME OF THE TIME

## 2021-03-28 ASSESSMENT — PAIN DESCRIPTION - PAIN TYPE
TYPE: ACUTE PAIN

## 2021-03-28 NOTE — PROGRESS NOTES
Assumed patient care. Took report from Maribel L&D RN. Assessed patient, VS stable and within defined parameters, fundus firm at U, lochia light rubra. No pain/redness/swelling in calves.  Patient reports pain as 0/10 on pain scale. Oriented patient to the post partum unit including room features, scheduled medications, welcome letter, and the post partum depression scale. Educated patient on room safety and infant safety. Patient understands and verbalizes safe infant sleeping practices. Current COVID-19 mask policy gone over. Patient and support person acknowledge policy. Current visitor policy discussed. Patient and support person acknowledge policy. Call light within reach. Will continue to monitor patient's vitals.

## 2021-03-28 NOTE — CARE PLAN
Problem: Altered physiologic condition related to immediate post-delivery state and potential for bleeding/hemorrhage  Goal: Patient physiologically stable as evidenced by normal lochia, palpable uterine involution and vital signs within normal limits  Outcome: PROGRESSING AS EXPECTED  Note: Fundus firm, light lochia noted. Patient able to ambulate and provide self perineal care. Monitoring and assessing patient vital signs.      Problem: Potential for postpartum infection related to presence of episiotomy/vaginal tear and/or uterine contamination  Goal: Patient will be absent from signs and symptoms of infection  Outcome: PROGRESSING AS EXPECTED  Note: Patient exhibits no s/s of infection. Using standard precaution while providing care to patient. Patient demonstrate effective hand washing.

## 2021-03-28 NOTE — PROGRESS NOTES
1900 Report received from Meri MARCUS, care assumed.     1910 500ml Amniofusion started per Dr. Garcia due to prolonged decels, followed by 100ml/hr.    1932 Pts BS 72, D5LR started per Dr. Garcia.    2001 Delivery of female infant 8/9 APGARS.     2200 Pt up to BR to void, denies weakness. Pt states she feels lightheaded, assisted pt back into bed. Pt states she no longer feels dizzy.    2250 Pt up to BR to void w/o difficulty. Denies dizziness or weakness at this time.     2300 Pt BP elevated, Pt states she is starting to feel anxious. Anxiety medication given per Dr. Garcia (see MAR).     2320 Pt stable, denies dizziness/being lightheaded, transferred to PP unit, report given to Siri MARCUS.

## 2021-03-28 NOTE — ANESTHESIA POSTPROCEDURE EVALUATION
Patient: Bela Gonzalez    Procedure Summary     Date: 03/27/21 Room / Location:     Anesthesia Start: 1613 Anesthesia Stop: 2001    Procedure: Labor Epidural Diagnosis:     Scheduled Providers:  Responsible Provider: Michell Xiao M.D.    Anesthesia Type: epidural ASA Status: 3          Final Anesthesia Type: epidural  Last vitals  BP   Blood Pressure: 136/63    Temp   36.2 °C (97.2 °F)    Pulse   (!) 127   Resp   16    SpO2          Anesthesia Post Evaluation    Patient location during evaluation: bedside  Patient participation: complete - patient participated  Level of consciousness: awake and alert  Pain score: 0    Airway patency: patent  Anesthetic complications: no  Cardiovascular status: adequate  Respiratory status: acceptable  Hydration status: acceptable    PONV: none          No complications documented.

## 2021-03-28 NOTE — PROGRESS NOTES
1800  Report from VIRGIE Hardy in room, pt reports having the shakes, 1810 BS @ 74, pt given an apple sauce and saltine cracker.  Raised from Trendelenberg to eat and then if BP's maintained that she may stay in that position.  1821  Variable Down to 70 x 70 sec with return to baseline of 135 with Moderate variability.  1830  Report to Dr. Garcia about BS and follow up with BS in 1 hr.  Pt reports she only ate half the applesauce and saltine cracker as it made her nauseated.  1838  Prolonged Deceleration down to 75x 2.5min with return to baseline of 130 and moderate variability.  Position change to WL and SVE with mild cervical change made at this time.  1851  Variable down to 50 x 60 sec with return to 140s following.  Report to Dr. Garcia and orders for amnioinfusion received at this time.  500ml bolus with 100/hr.  1900  Report to NOC RN in room with pt and pt has no further complaints.  She reports feeling tightening on her stomach and UC's that are not tracing.  Possible the IUPC does need to be replaced.

## 2021-03-28 NOTE — LACTATION NOTE
@1511 baby was [id number] with MOB when LC arrived, MOB states baby has breast fed well a few times since birth last night, she states baby was given formula during the night because she was being monitored for low blood sugars, baby has had 3 blood sugars WDL since initially having three low blood sugars, MOB had diet controlled gestational diabetes during pregnancy, MOB states baby finished breastfeeding right before LC arrived, latch attempted while LC was in the room but baby remained sleepy and no sustained suckling noted, encouraged ad griselda breastfeeding at least Q 3-4 hours (more often if feeding cues noted), encouraged [id number], educated on signs of adequate infant intake, MOB denies having any additional questions or concerns for LC at this time, encouraged to call for assistance as needed

## 2021-03-28 NOTE — PROGRESS NOTES
Patient states taking Metoprolol 25 mg every 12 hours at home and requesting to take medication, RN notified Dr. Garcia, per MD new orders received of Metoprolol 25 mg every 12 hours.

## 2021-03-28 NOTE — L&D DELIVERY NOTE
"Delivery Summary    Bela Gonzalez is a 44 yo  who presented at 37w3d with SROM. She did not receive any augmentation. She received an epidural for anesthesia. She did develop a category II tracing with prolonged decelerations due to hypotension. She progressed rapidly to complete dilation where with pushing efforts she underwent a spontaneous vaginal delivery of a viable female infant \"Maryam\" in SHELLI position with APGARS 8/9. The head delivered atraumatically. The anterior shoulder delivered with gentle downward pressure and the remainder of the body followed. The infant was placed on the maternal chest. The cord was clamped and cut after a 30 second delay. Pitocin was administered. The placenta was delivered with gentle pressure. The uterus firmed with fundal pressure and pitocin. The perineum was examined and a second degree laceration was repaired with 3.0 chromic in the usual fashion.    EBL: 350cc  Anesthesia: epidural  Complications: None    Neris Garcia M.D.      "

## 2021-03-28 NOTE — PROGRESS NOTES
"OB Post Partum  Note    Reports occipital HA this am but didn't sleep well. Denies visual disturbances or epigastric pain. Denies significant swelling. Pain controlled. Normal lochia. Ambulating, eating and voiding without difficulty. Baby is in nursery for hypoglycemia.     /64   Pulse 99   Temp 37 °C (98.6 °F) (Temporal)   Resp 17   Ht 1.6 m (5' 3\")   Wt 112 kg (248 lb)   SpO2 97%   Breastfeeding Yes   BMI 43.93 kg/m²     Gen: NAD, resting comfortably  GI: soft, non tender to palpation  : fundus firm and below umbilicus  Ext: no edema, 2+ reflexes in B/L LE    Recent Labs     03/27/21  1552 03/28/21  0438   WBC 15.3* 18.1*   RBC 4.06* 3.40*   HEMOGLOBIN 11.3* 9.4*   HEMATOCRIT 35.7* 30.5*   MCV 87.9 89.7   MCH 27.8 27.6   RDW 46.4 48.0   PLATELETCT 358 275   MPV 10.0 9.8   NEUTSPOLYS 75.50*  --    LYMPHOCYTES 16.50*  --    MONOCYTES 7.10  --    EOSINOPHILS 0.20  --    BASOPHILS 0.20  --        Assessment:  Post partum day #1 doing clinically well  CHTN  A1DM  Asymptomatic anemia    Plan:  Symptomatic management of HA this AM. If no improvement, discussed with patient this may be neurologic findings of superimposed preeclampsia that warrant seizure prophylaxis. She will monitor  Start ferrous sulfate  Routine post partum care  Anticipate discharge on post partum day 2    Neris Garcia M.D.  "

## 2021-03-28 NOTE — PROGRESS NOTES
OB Progress Note (Delayed entry from 1715)    Feeling dizzy after epidural. BP low but tachycardic and patient cannot get ephedrine. FHT with baseline of 140's. Moderate variability present. Accelerations present. Intermittent late and prolonged variable decelerations present. Mellott not picking up contractions well. SVE 6-7/80/-1. IUPC placed. Intrauterine resuscitative efforts. No augmentation at this time. Continue to monitor.

## 2021-03-28 NOTE — DISCHARGE PLANNING
"Discharge Planning Assessment Post-Partum    Reason for Referral: \"Maternal hx of depression.\"    Address: [address] [place], [place] [address] [address]  Type of Living Situation: MOB lives in a home with FOB, her mother, and other children.    Mom Diagnosis: Post Partum    Baby Diagnosis:     Primary Language: English    Name of Baby: Maryam Gonzalez    Father of the Baby: Jose Alfredo Lowry    Involved in baby’s care? Yes, was present at bedside during assessment.    Contact Information: [phone]    Prenatal Care: Yes, with Dr. Ba and a high risk pregnancy doctor.    Mom's PCP: Dr. Loving    PCP for new baby: Dr. Chavarria    Support System: MOB and FOB report having a strong support system in place with their families.     Coping/Bonding between mother & baby: Yes, good. Bedside RN reports that both parents have been appropriate with baby.    Source of Feeding: Breast feeding and supplementing with formula.     Supplies for Infant: They report having all needed supplies for baby including a car seat, crib, clothing, diapers, etc.     Mom's Insurance: [place] [employer] [facility]    Baby Covered on Insurance: [place] [employer] Blue Shield    Mother Employed/School: Both MOB and FOB are employed full time at [employer]    Other children in the home/names & ages: Agnieszka (age 12) lives with them full time, Andres (age 12) lives with them 25% of the time, and Edin (age 13 lives with them 25% of the time.     Financial Hardship/Income: They deny having any financial concerns.     Mom's Mental Status: MOB was calm and cooperative during assessment. She reports her mood is \"good.\" She is alert and oriented to person, place, time, and situation.    Services used prior to admit: WIC for first child. Does not qualify for WIC now.    CPS History:  MOB denies. FOB shared that CPS visited him once during his divorce with his ex wife as part of mediation. He states that there was not an open case.     Psychiatric History: " "CARROLL shared that she has anxiety which is being treated by her PCP (currently taking Celexa). She reports that the Celexa works really well for her anxiety. She states that when she is feeling really anxious she is able to take a \"break\" and/or remove herself from the stressful situation. She denies having a diagnosis of depression. CARROLL reports that she feels comfortable talking to her  and doctor should she start feeling depressed and if her anxiety starts to increase.     Domestic Violence History: Denies    Drug/ETOH History: CARROLL shared that she has history of drug use, but states that she has not used any drugs for 15 years. She denies having and cravings or urges to use drugs.     Resources Provided: None indicated    Referrals Made: None indicated     Clearance for Discharge: Baby is cleared to discharge home with parents once medically cleared.    Ongoing Plan: SW will remain available to assist and provide support as needed. Baby is cleared to discharge home with parents once medically cleared.  "

## 2021-03-28 NOTE — PROGRESS NOTES
"0700:Report received from Siri MARCUS. Assumed Patient care. Patient appears calm and in no acute distress. Labs and orders reviewed.     0900:Assessment completed. Fundus firm, light lochia noted. Patient states 3/10 \"headache\" pain, patient requesting PRN pain medication, patient medicated, refer to MAR. Patient educated to notify RN if pain is getting worse, patient verbalizes understanding. Patient educated on keeping infant bundled up and/or skin-to-skin, safe sleep policy for infant, and infant feeding frequency, patient verbalizes understanding. Patient provided with scheduled medication, refer to MAR. Plan of care discussed with patient; questions have been answered at this time. Patient needs have been met at this time. Patient encouraged to call when needing assistance. Call light within reach. Rounding in place. /66   Pulse 94   Temp 36.4 °C (97.6 °F) (Temporal)   Resp 18   Ht 1.6 m (5' 3\")   Wt 112 kg (248 lb)   SpO2 96%   Breastfeeding Yes   BMI 43.93 kg/m² .   "

## 2021-03-28 NOTE — ANESTHESIA TIME REPORT
Anesthesia Start and Stop Event Times     Date Time Event    3/27/2021 1613 Ready for Procedure     1613 Anesthesia Start     2001 Anesthesia Stop        Responsible Staff  03/27/21    Name Role Begin End    Michell Xiao M.D. Anesth 1613 2001        Preop Diagnosis (Free Text):  Pre-op Diagnosis             Preop Diagnosis (Codes):    Post op Diagnosis  Pain during labor      Premium Reason  E. Weekend    Comments:

## 2021-03-29 ENCOUNTER — PHARMACY VISIT (OUTPATIENT)
Dept: PHARMACY | Facility: MEDICAL CENTER | Age: 46
End: 2021-03-29
Payer: COMMERCIAL

## 2021-03-29 VITALS
HEIGHT: 63 IN | DIASTOLIC BLOOD PRESSURE: 82 MMHG | BODY MASS INDEX: 43.94 KG/M2 | SYSTOLIC BLOOD PRESSURE: 131 MMHG | TEMPERATURE: 98.7 F | RESPIRATION RATE: 18 BRPM | OXYGEN SATURATION: 99 % | HEART RATE: 104 BPM | WEIGHT: 248 LBS

## 2021-03-29 PROBLEM — D62 ACUTE BLOOD LOSS AS CAUSE OF POSTOPERATIVE ANEMIA: Status: ACTIVE | Noted: 2021-03-29

## 2021-03-29 PROBLEM — O24.419 GESTATIONAL DIABETES MELLITUS (GDM) IN THIRD TRIMESTER: Status: ACTIVE | Noted: 2021-03-29

## 2021-03-29 PROBLEM — O10.919 CHRONIC HYPERTENSION AFFECTING PREGNANCY: Status: ACTIVE | Noted: 2021-03-29

## 2021-03-29 PROCEDURE — 700102 HCHG RX REV CODE 250 W/ 637 OVERRIDE(OP): Performed by: OBSTETRICS & GYNECOLOGY

## 2021-03-29 PROCEDURE — RXMED WILLOW AMBULATORY MEDICATION CHARGE: Performed by: OBSTETRICS & GYNECOLOGY

## 2021-03-29 PROCEDURE — A9270 NON-COVERED ITEM OR SERVICE: HCPCS | Performed by: OBSTETRICS & GYNECOLOGY

## 2021-03-29 RX ORDER — IBUPROFEN 600 MG/1
600 TABLET ORAL EVERY 6 HOURS PRN
Qty: 30 TABLET | Refills: 0 | Status: SHIPPED | OUTPATIENT
Start: 2021-03-29

## 2021-03-29 RX ADMIN — IBUPROFEN 600 MG: 600 TABLET, FILM COATED ORAL at 07:38

## 2021-03-29 RX ADMIN — FERROUS SULFATE TAB 325 MG (65 MG ELEMENTAL FE) 325 MG: 325 (65 FE) TAB at 07:38

## 2021-03-29 RX ADMIN — METOPROLOL TARTRATE 25 MG: 25 TABLET, FILM COATED ORAL at 07:38

## 2021-03-29 RX ADMIN — PRENATAL WITH FERROUS FUM AND FOLIC ACID 1 TABLET: 3080; 920; 120; 400; 22; 1.84; 3; 20; 10; 1; 12; 200; 27; 25; 2 TABLET ORAL at 07:38

## 2021-03-29 RX ADMIN — DOCUSATE SODIUM 100 MG: 100 CAPSULE, LIQUID FILLED ORAL at 07:38

## 2021-03-29 ASSESSMENT — PAIN DESCRIPTION - PAIN TYPE
TYPE: ACUTE PAIN

## 2021-03-29 NOTE — CARE PLAN
Problem: Alteration in comfort related to episiotomy, vaginal repair and/or after birth pains  Goal: Patient is able to ambulate, care for self and infant  Outcome: PROGRESSING AS EXPECTED     Problem: Potential knowledge deficit related to lack of understanding of self and  care  Goal: Patient will verbalize understanding of self and infant care  Outcome: PROGRESSING AS EXPECTED

## 2021-03-29 NOTE — PROGRESS NOTES
Assessment complete. Fundus firm, lochia scant.  Pt verbalized understanding. Denies pain at this time, educated to call if pt has pain.

## 2021-03-29 NOTE — CARE PLAN
Problem: Knowledge Deficit  Goal: Knowledge of disease process/condition, treatment plan, diagnostic tests, and medications will improve  Outcome: MET  Note: POC discussed. D/C paperwork reviewed and all questions answered.      Problem: Altered physiologic condition related to immediate post-delivery state and potential for bleeding/hemorrhage  Goal: Patient physiologically stable as evidenced by normal lochia, palpable uterine involution and vital signs within normal limits  Outcome: MET  Note: Patient is physiologically stable as evidenced by scant lochia rubra, firm fundus one fingerbreadth below the umbilicus, and vital signs WDL. Will continue to monitor patient condition.

## 2021-03-29 NOTE — PROGRESS NOTES
Discharge education reviewed with patient and partner. Medication list reviewed. Pt verbalized understanding of discharge instructions including follow-up appointments. Patient given copies of discharge education and discharge summary handouts. Pt verbalizes understanding and all questions answered. D/C pending meds-to-beds delivery.

## 2021-03-29 NOTE — DISCHARGE PLANNING
Meds-to-Beds: Discharge prescription order listed below delivered to patient's bedside. RN notified. Patient counseled. Patient elected to have co-payment billed to patient account.      JohnsonemilyBela hodge   Home Medication Instructions NASRIN:[id number]    Printed on:03/29/21 6176   Medication Information                      ibuprofen (MOTRIN) 600 MG Tab  Take 1 tablet by mouth every 6 hours as needed for Moderate Pain.               Shira Mai, PharmD

## 2021-03-29 NOTE — DISCHARGE INSTRUCTIONS
PATIENT DISCHARGE EDUCATION INSTRUCTION SHEET  REASONS TO CALL YOUR OBSTETRICIAN  · Persistent fever, shaking, chills (Temperature higher than 100.4) may indicate you have an infection  · Heavy bleeding: soaking more than 1 pad per hour; Passing clots an egg-sized clot or bigger may mean you have an postpartum hemorrhage  · Foul odor from vagina or bad smelling or discolored discharge or blood  · Breast infection (Mastitis symptoms); breast pain, chills, fever, redness or red streaks, may feel flu like symptoms  · Urinary pain, burning or frequency  · Incision that is not healing, increased redness, swelling, tenderness or pain, or any pus from episiotomy or  site may mean you have an infection  · Redness, swelling, warmth, or painful to touch in the calf area of your leg may mean you have a blood clot  · Severe or intensified depression, thoughts or feelings of wanting to hurt yourself or someone else   · Pain in chest, obstructed breathing or shortness of breath (trouble catching your breath) may mean you are having a postpartum complication. Call your provider immediately   · Headache that does not get better, even after taking medicine, a bad headache with vision changes or pain in the upper right area of your belly may mean you have high blood pressure or post birth preeclampsia. Call your provider immediately    HAND WASHING  All family and friends should wash their hands:  · Before and after holding the baby  · Before feeding the baby  · After using the restroom or changing the baby's diaper    WOUND CARE  Ask your physician for additional care instructions. In general:  · Episiotomy/Laceration  · May use kushal-spray bottle, witch hazel pads and dermaplast spray for comfort  · Use kushal-spray bottle after urinating to cleanse perineal area  · To prevent burning during urination spray kushal-water bottle on labial area   · Pat perineal area dry until episiotomy/laceration is healed  · Continue to use  kushal-bottle until bleeding stops as needed  · If have a 2nd degree laceration or greater, a Sitz bath can offer relief from soreness, burning, and inflammation   · Sitz Bath   · Sit in 6 inches of warm water and soak laceration as needed until the laceration heals    VAGINAL CARE AND BLEEDING  · Nothing inside vagina for 6 weeks:   · No sexual intercourse, tampons or douching  · Bleeding may continue for 2-4 weeks. Amount and color may vary  · Soaking 1 pad or more in an hour for several hours is considered heavy bleeding  · Passing large egg sized blood clots can be concerning  · If you feel like you have heavy bleeding or are having increasing amount of blood clots call your Obstetrician immediately  · If you begin feeling faint upon standing, feeling sick to your stomach, have clammy skin, a really fast heartbeat, have chills, start feeling confused, dizzy, sleepy or weak, or feeling like you're going to faint call your Obstetrician immediately    HYPERTENSION   Preeclampsia or gestational hypertension are types of high blood pressure that only pregnant women can get. It is important for you to be aware of symptoms to seek early intervention and treatment. If you have any of these symptoms immediately call your Obstetrician    · Vision changes or blurred vision   · Severe headache or pain that is unrelieved with medication and will not go away  · Persistent pain in upper abdomen or shoulder   · Increased swelling of face, feet, or hands  · Difficulty breathing or shortness of breath at rest  · Urinating less than usual    URINATION AND BOWEL MOVEMENTS  · Eating more fiber (bran cereal, fruits, and vegetables) and drinking plenty of fluids will help to avoid constipation  · Urinary frequency and urgency after childbirth is normal  · If you experience any urinary pain, burning or frequency call your provider    BIRTH CONTROL  · It is possible to become pregnant at any time after delivery and while  "breastfeeding  · Plan to discuss a method of birth control with your physician at your post delivery follow up visit    POSTPARTUM BLUES  During the first few days after birth, you may experience a sense of the \"blues\" which may include impatience, irritability or even crying. These feelings come and go quickly. However, as many as 1 in 10 women experience emotional symptoms known as postpartum depression.     POSTPARTUM DEPRESSION  May start as early as the second or third day after delivery or take several weeks or months to develop. Symptoms of \"blues\" are present, but are more intense: Crying spells; loss of appetite; feelings of hopelessness or loss of control; fear of touching the baby; over concern or no concern at all about the baby; little or no concern about your own appearance/caring for yourself; and/or inability to sleep or excessive sleeping. Contact your Obstetrician if you are experiencing any of these symptoms     PREVENTING SHAKEN BABY  If you are angry or stressed, PUT THE BABY IN THE CRIB, step away, take some deep breaths, and wait until you are calm to care for the baby. DO NOT SHAKE THE BABY. You are not alone, call a supporter for help.  · [facility] 24/7 crisis call line (773-205-1823) or (1-965.293.8883)  · You can also text them, text \"ANSWER\" (826988)      "

## 2021-03-29 NOTE — DISCHARGE SUMMARY
"Discharge Summary    Admission Date: 3/27/21    Discharge Date: 3/29/21    Diagnosis:  Active Problems:    Inappropriate sinus tachycardia     (spontaneous vaginal delivery)    Acute blood loss as cause of postoperative anemia    Chronic hypertension affecting pregnancy    Gestational diabetes mellitus (GDM) in third trimester    Subjective:  HA is resolved. Denies visual disturbance or abdominal pain. Denies swelling. Pain controlled. Normal lochia. Eating, voiding and ambulating without difficulty.Breast feeding.    /83   Pulse (!) 104   Temp 36.8 °C (98.2 °F) (Temporal)   Resp 17   Ht 1.6 m (5' 3\")   Wt 112 kg (248 lb)   SpO2 98%   Breastfeeding Yes   BMI 43.93 kg/m²     GEN: NAD  GI:soft, NT, ND  :fundus firm and below umbilicus  EXT:no edema    Hospital Course:Bela Gonzalez is a 44 yo  admitted at 37w3d with SROM.Hx of chronic HTN and preeclampsia labs negative on admission. She is s/p  of a viable female infant with APGARS of 8/9. EBL of 350cc. Second degree laceration repaired. Post partum course notable for asymptomatic anemia. She was meeting all post partum goals and was stable for discharge home on PPD#2.     Discharge Instructions   1. Diet : general  2. Activity: Pelvic rest for 6 weeks     Bela Gonzalez Christine   Home Medication Instructions NASRIN:[id number]    Printed on:21 0548   Medication Information                      Accu-Chek FastClix Lancets Misc               ACCU-CHEK GUIDE strip               aspirin (ASA) 81 MG Chew Tab chewable tablet  Chew 81 mg every day.             Blood Glucose Monitoring Suppl (ACCU-CHEK GUIDE) w/Device Kit               CALCIUM PO  Take  by mouth.             citalopram (CELEXA) 20 MG Tab  Take 20 mg by mouth every day.             diphenhydrAMINE HCl (BENADRYL ALLERGY PO)  Take  by mouth at bedtime as needed.             Esomeprazole Magnesium (NEXIUM) 20 MG Pack  Take 20 mg by mouth.             ibuprofen (MOTRIN) 600 MG " Tab  Take 1 tablet by mouth every 6 hours as needed for Moderate Pain.             loratadine (CLARITIN) 10 MG Tab  Take 10 mg by mouth every day.             metoprolol tartrate (LOPRESSOR) 25 MG Tab  Take 1 tablet by mouth 2 times a day.             MULTIPLE VITAMIN PO  Take 1 Tab by mouth every day. Indications: Prenantal             Omega-3 Fatty Acids (FISH OIL PO)  Take  by mouth.             pantoprazole (PROTONIX) 40 MG Tablet Delayed Response                   Follow up: 6 weeks    Complications:none    Neris Garcia M.D.

## 2021-03-29 NOTE — PROGRESS NOTES
0700: 12 hour chart check completed and orders/MAR reviewed.     0738: Patient assessment completed. Discussed pain management plan and patient requests Motrin be offered as available. Patient denies dizziness and headaches; states she is voiding w/o difficulty. Reviewed plan of care, all questions answered, and rounding in place.

## 2021-05-25 ENCOUNTER — OFFICE VISIT (OUTPATIENT)
Dept: CARDIOLOGY | Facility: MEDICAL CENTER | Age: 46
End: 2021-05-25

## 2021-05-25 ENCOUNTER — OFFICE VISIT (OUTPATIENT)
Dept: CARDIOLOGY | Facility: MEDICAL CENTER | Age: 46
End: 2021-05-25
Payer: COMMERCIAL

## 2021-05-25 VITALS
OXYGEN SATURATION: 96 % | HEIGHT: 63 IN | HEART RATE: 92 BPM | WEIGHT: 230 LBS | SYSTOLIC BLOOD PRESSURE: 114 MMHG | DIASTOLIC BLOOD PRESSURE: 76 MMHG | BODY MASS INDEX: 40.75 KG/M2

## 2021-05-25 VITALS
RESPIRATION RATE: 14 BRPM | DIASTOLIC BLOOD PRESSURE: 82 MMHG | SYSTOLIC BLOOD PRESSURE: 130 MMHG | HEIGHT: 63 IN | OXYGEN SATURATION: 98 % | WEIGHT: 249 LBS | HEART RATE: 96 BPM | BODY MASS INDEX: 44.12 KG/M2

## 2021-05-25 DIAGNOSIS — R00.2 PALPITATIONS: ICD-10-CM

## 2021-05-25 DIAGNOSIS — O10.919 CHRONIC HYPERTENSION AFFECTING PREGNANCY: ICD-10-CM

## 2021-05-25 DIAGNOSIS — E66.3 OVERWEIGHT: ICD-10-CM

## 2021-05-25 DIAGNOSIS — I47.11 INAPPROPRIATE SINUS TACHYCARDIA (HCC): ICD-10-CM

## 2021-05-25 DIAGNOSIS — R06.02 SHORTNESS OF BREATH: ICD-10-CM

## 2021-05-25 DIAGNOSIS — D62 ACUTE BLOOD LOSS AS CAUSE OF POSTOPERATIVE ANEMIA: ICD-10-CM

## 2021-05-25 LAB — EKG IMPRESSION: NORMAL

## 2021-05-25 PROCEDURE — 93000 ELECTROCARDIOGRAM COMPLETE: CPT | Performed by: INTERNAL MEDICINE

## 2021-05-25 PROCEDURE — 99203 OFFICE O/P NEW LOW 30 MIN: CPT | Performed by: INTERNAL MEDICINE

## 2021-05-25 PROCEDURE — 99214 OFFICE O/P EST MOD 30 MIN: CPT | Performed by: NURSE PRACTITIONER

## 2021-05-25 RX ORDER — TOPIRAMATE 25 MG/1
25 TABLET ORAL DAILY
COMMUNITY
Start: 2021-04-22

## 2021-05-25 ASSESSMENT — ENCOUNTER SYMPTOMS
CLAUDICATION: 0
PALPITATIONS: 1
PND: 0
COUGH: 0
MYALGIAS: 0
NERVOUS/ANXIOUS: 0
ABDOMINAL PAIN: 0
SHORTNESS OF BREATH: 1
FEVER: 0
ORTHOPNEA: 0

## 2021-05-25 ASSESSMENT — FIBROSIS 4 INDEX
FIB4 SCORE: 0.74
FIB4 SCORE: 0.74

## 2021-05-25 NOTE — PROGRESS NOTES
Subjective:   Bela Gonzalez is a 39 y.o. female who presents today for postpartum follow up for inappropriate sinus tachycardia.    She is a patient of Dr. Sullivan, previous Dr. Bellamy patient.    Hx of palpitations with inappropriate sinus tachycardia, gestational diabetes, and obesity.    She presents today with her  and 2 month old daughter. She had a spontaneous delivery at 37 weeks with no cardiac concerns during labor or postpartum.    She was actually feeling better, her breathing was not short and she had good HR control after delivery. She got her first COVID vaccination and for the last week has started to experience symptoms again of shortness of breath with minimal activity and palpitations and accelerated HR's at rest.    She has been on metoprolol for almost 8 years now.     She has no lower extremity edema, orthopnea, chest pain, or lightheadedness.    Las monitor was in '16. Last echo in '10.    Past Medical History:   Diagnosis Date   • Diabetes     gestational   • Elevated WBC count    • Heart burn    • Inappropriate sinus node tachycardia    • Indigestion    • Migraine    • Pain 01/07/15    Back=chronic(scoliosis)>4/10   • Scoliosis      Past Surgical History:   Procedure Laterality Date   • CHOLECYSTECTOMY  2016   • BETH BY LAPAROSCOPY N/A 1/15/2015    Procedure: BETH BY LAPAROSCOPY;  Surgeon: Hima Joseph M.D.;  Location: SURGERY [facility];  Service:    • RECOVERY  5/23/2012    Performed by SURGERY, IR-RECOVERY at SURGERY SAME DAY [facility] ORS   • OTHER      wisdom teeth     Family History   Problem Relation Age of Onset   • Diabetes Mother    • Asthma Sister      Social History     Tobacco Use   Smoking Status Never Smoker   Smokeless Tobacco Never Used     Allergies   Allergen Reactions   • Azithromycin      Z-pack- per pt causes severe diarrhea        Outpatient Encounter Medications as of 5/25/2021   Medication Sig Dispense Refill   • topiramate (TOPAMAX) 25 MG  "Tab 25 mg every day.     • ibuprofen (MOTRIN) 600 MG Tab Take 1 tablet by mouth every 6 hours as needed for Moderate Pain. 30 tablet 0   • loratadine (CLARITIN) 10 MG Tab Take 10 mg by mouth every day.     • Esomeprazole Magnesium (NEXIUM) 20 MG Pack Take 20 mg by mouth.     • metoprolol tartrate (LOPRESSOR) 25 MG Tab Take 1 tablet by mouth 2 times a day. 60 tablet 11   • Omega-3 Fatty Acids (FISH OIL PO) Take  by mouth.     • CALCIUM PO Take  by mouth.     • diphenhydrAMINE HCl (BENADRYL ALLERGY PO) Take  by mouth at bedtime as needed.     • MULTIPLE VITAMIN PO Take 1 Tab by mouth every day. Indications: Prenantal     • citalopram (CELEXA) 20 MG Tab Take 20 mg by mouth every day.     • [DISCONTINUED] aspirin (ASA) 81 MG Chew Tab chewable tablet Chew 81 mg every day. (Patient not taking: Reported on 5/25/2021)     • [DISCONTINUED] pantoprazole (PROTONIX) 40 MG Tablet Delayed Response  (Patient not taking: Reported on 5/25/2021)     • Blood Glucose Monitoring Suppl (ACCU-CHEK GUIDE) w/Device Kit      • ACCU-CHEK GUIDE strip      • Accu-Chek FastClix Lancets [facility]        No facility-administered encounter medications on file as of 5/25/2021.     Review of Systems   Constitutional: Negative for fever and malaise/fatigue.   Respiratory: Positive for shortness of breath. Negative for cough.         Exertional with walking up stairs   Cardiovascular: Positive for palpitations. Negative for chest pain, orthopnea, claudication, leg swelling and PND.        At rest and exertion-HR up to 120 at rest    Gastrointestinal: Negative for abdominal pain.   Musculoskeletal: Negative for myalgias.   Neurological: Weakness:    Psychiatric/Behavioral: The patient is not nervous/anxious.    All other systems reviewed and are negative.       Objective:   /82 (BP Location: Left arm, Patient Position: Sitting, BP Cuff Size: Adult)   Pulse 96   Resp 14   Ht 1.6 m (5' 3\")   Wt 113 kg (249 lb)   SpO2 98%   BMI 44.11 kg/m² "     Physical Exam  Vitals and nursing note reviewed.   Constitutional:       General: She is not in acute distress.     Appearance: Normal appearance. She is well-developed. She is obese.      Comments:      HENT:      Head: Normocephalic and atraumatic.   Eyes:      Extraocular Movements: Extraocular movements intact.   Neck:      Vascular: No JVD.   Cardiovascular:      Rate and Rhythm: Normal rate and regular rhythm.      Pulses: Normal pulses.      Heart sounds: Normal heart sounds. No murmur heard.     Pulmonary:      Effort: Pulmonary effort is normal. No respiratory distress.      Breath sounds: Normal breath sounds.   Abdominal:      General: Bowel sounds are normal.      Palpations: Abdomen is soft.      Comments: 19 weeks pregnant   Musculoskeletal:         General: Normal range of motion.      Cervical back: Normal range of motion.   Skin:     General: Skin is warm and dry.      Capillary Refill: Capillary refill takes less than 2 seconds.   Neurological:      General: No focal deficit present.      Mental Status: She is alert and oriented to person, place, and time. Mental status is at baseline.   Psychiatric:         Mood and Affect: Mood normal.         Behavior: Behavior normal.         Thought Content: Thought content normal.         Judgment: Judgment normal.       Assessment:     1. Inappropriate sinus tachycardia  EC-ECHOCARDIOGRAM COMPLETE W/O CONT    CANCELED: EC-ECHOCARDIOGRAM COMPLETE W/O CONT   2. Palpitations  EC-ECHOCARDIOGRAM COMPLETE W/O CONT    CANCELED: EC-ECHOCARDIOGRAM COMPLETE W/O CONT   3. Chronic hypertension affecting pregnancy  EC-ECHOCARDIOGRAM COMPLETE W/O CONT    CANCELED: EC-ECHOCARDIOGRAM COMPLETE W/O CONT   4. Acute blood loss as cause of postoperative anemia  CBC WITHOUT DIFFERENTIAL   5. Overweight       Medical Decision Making:  Today's Assessment / Status / Plan:     1. Palpitations from inappropriate sinus tachycardia  -changed to metoprolol tartrate 25 mg BID at last  follow up  -palpitations and elevated resting HR's remain postpartum but also after COVID vaccination  -echo in '10 unremarkable, repeat echo ordered with persistent shortness of breath  -consider repeat monitor if warranted by EP after consultation  -EP consultation per patient request for persistent symptoms  -repeat CBC with anemia postpartum and worsening REYEZ    2. Overweight   -lifestyle modifications  -cont with dietary changes and start light walking program when cleared by OB    Patient is to follow up with EP as planned today for consultation and considerations in changes in medication therapy; echo and CBC ordered, follow with EP for future visits. General cardiology PRN.

## 2021-05-25 NOTE — PROGRESS NOTES
Arrhythmia Clinic Note (New patient)     DOS: 5/25/2021    Referring physician: Dr Sullivan    Chief complaint/Reason for consult: Shortness of breath    HPI: 45-year-old female, history of recent pregnancy,   Longstanding history of metoprolol use for sinus tachycardia, history of polysubstance abuse, referred for evaluation of shortness of breath.  Over the past week or 2, she was feeling more shortness of breath.  She felt short of breath during pregnancy and it was significantly improved after pregnancy.  No recent medication changes.  She was seen by general cardiology APN who recommended evaluation by electrophysiology for sinus tachycardia.  She denies chest pain.  No syncope or presyncope.  Shortness of breath with exertion climbing and descending stairs.    ROS (+ highlighted in bold):  Constitutional: Fevers/chills/fatigue/weightloss  HEENT: Blurry vision/eye pain/sore throat/hearing loss  Respiratory: Shortness of breath/cough  Cardiovascular: Chest pain/palpitations/edema/orthopnea/syncope  GI: Nausea/vomitting/diarrhea  MSK: Arthralgias/myagias/muscle weakness  Skin: Rash/sores  Neurological: Numbness/tremors/vertigo  Endocrine: Excessive thirst/polyuria/cold intolerance/heat intolerance  Psych: Depression/anxiety    Past Medical History:   Diagnosis Date   • Diabetes     gestational   • Elevated WBC count    • Heart burn    • Inappropriate sinus node tachycardia    • Indigestion    • Migraine    • Pain 01/07/15    Back=chronic(scoliosis)>4/10   • Scoliosis        Past Surgical History:   Procedure Laterality Date   • CHOLECYSTECTOMY  2016   • BETH BY LAPAROSCOPY N/A 1/15/2015    Procedure: BETH BY LAPAROSCOPY;  Surgeon: Hima Joseph M.D.;  Location: SURGERY [facility];  Service:    • RECOVERY  5/23/2012    Performed by SURGERY, IR-RECOVERY at SURGERY SAME DAY [facility] ORS   • OTHER      wisdom teeth       Social History     Socioeconomic History   • Marital status:      Spouse name:  Not on file   • Number of children: Not on file   • Years of education: Not on file   • Highest education level: Not on file   Occupational History   • Not on file   Tobacco Use   • Smoking status: Never Smoker   • Smokeless tobacco: Never Used   Vaping Use   • Vaping Use: Never used   Substance and Sexual Activity   • Alcohol use: No   • Drug use: No     Comment: quit Meth 9 years ago   • Sexual activity: Not on file   Other Topics Concern   • Not on file   Social History Narrative   • Not on file     Social Determinants of Health     Financial Resource Strain:    • Difficulty of Paying Living Expenses:    Food Insecurity:    • Worried About Running Out of Food in the Last Year:    • Ran Out of Food in the Last Year:    Transportation Needs:    • Lack of Transportation (Medical):    • Lack of Transportation (Non-Medical):    Physical Activity:    • Days of Exercise per Week:    • Minutes of Exercise per Session:    Stress:    • Feeling of Stress :    Social Connections:    • Frequency of Communication with Friends and Family:    • Frequency of Social Gatherings with Friends and Family:    • Attends [employer] Services:    • Active Member of Clubs or Organizations:    • Attends Club or Organization Meetings:    • Marital Status:    Intimate Partner Violence:    • Fear of Current or Ex-Partner:    • Emotionally Abused:    • Physically Abused:    • Sexually Abused:        Family History   Problem Relation Age of Onset   • Diabetes Mother    • Asthma Sister        Allergies   Allergen Reactions   • Azithromycin      Z-pack- per pt causes severe diarrhea          Current Outpatient Medications   Medication Sig Dispense Refill   • topiramate (TOPAMAX) 25 MG Tab 25 mg every day.     • ibuprofen (MOTRIN) 600 MG Tab Take 1 tablet by mouth every 6 hours as needed for Moderate Pain. 30 tablet 0   • loratadine (CLARITIN) 10 MG Tab Take 10 mg by mouth every day.     • Esomeprazole Magnesium (NEXIUM) 20 MG Pack Take 20 mg by  "mouth.     • metoprolol tartrate (LOPRESSOR) 25 MG Tab Take 1 tablet by mouth 2 times a day. 60 tablet 11   • Omega-3 Fatty Acids (FISH OIL PO) Take  by mouth.     • CALCIUM PO Take  by mouth.     • diphenhydrAMINE HCl (BENADRYL ALLERGY PO) Take  by mouth at bedtime as needed.     • MULTIPLE VITAMIN PO Take 1 Tab by mouth every day. Indications: Prenantal     • citalopram (CELEXA) 20 MG Tab Take 20 mg by mouth every day.     • Blood Glucose Monitoring Suppl (ACCU-CHEK GUIDE) w/Device Kit  (Patient not taking: Reported on 5/25/2021)     • ACCU-CHEK GUIDE strip  (Patient not taking: Reported on 5/25/2021)     • Accu-Chek FastClix Lancets Misc  (Patient not taking: Reported on 5/25/2021)       No current facility-administered medications for this visit.       Physical Exam:  Vitals:    05/25/21 1258   BP: 114/76   BP Location: Left arm   Patient Position: Sitting   BP Cuff Size: Adult   Pulse: 92   SpO2: 96%   Weight: 104 kg (230 lb)   Height: 1.6 m (5' 3\")     General appearance: NAD, conversant   Eyes: anicteric sclerae, moist conjunctivae; no lid-lag; PERRLA  HENT: Atraumatic; oropharynx clear with moist mucous membranes and no mucosal ulcerations; normal hard and soft palate  Neck: Trachea midline; FROM, supple, no thyromegaly or lymphadenopathy  Lungs: CTA, with normal respiratory effort and no intercostal retractions  CV: RRR, no MRGs, no JVD   Abdomen: Soft, non-tender; no masses or HSM  Extremities: No peripheral edema or extremity lymphadenopathy  Skin: Normal temperature, turgor and texture; no rash, ulcers or subcutaneous nodules  Psych: Appropriate affect, alert and oriented to person, place and time    Data:  Lipids:   Lab Results   Component Value Date/Time    CHOLSTRLTOT 200 (H) 12/06/2019 08:05 AM    TRIGLYCERIDE 106 12/06/2019 08:05 AM    HDL 47 12/06/2019 08:05 AM     (H) 12/06/2019 08:05 AM        BMP:  Lab Results   Component Value Date/Time    SODIUM 132 (L) 03/27/2021 1552    POTASSIUM 4.3 " 03/27/2021 1552    CHLORIDE 102 03/27/2021 1552    CO2 19 (L) 03/27/2021 1552    GLUCOSE 105 (H) 03/27/2021 1552    BUN 13 03/27/2021 1552    CREATININE 0.69 03/27/2021 1552    CALCIUM 9.1 03/27/2021 1552    ANION 11.0 03/27/2021 1552        TSH:   Lab Results   Component Value Date/Time    TSHULTRASEN 3.090 08/19/2016 0711        THYROXINE (T4):   No results found for: THORIR     CBC:   Lab Results   Component Value Date/Time    WBC 18.1 (H) 03/28/2021 04:38 AM    RBC 3.40 (L) 03/28/2021 04:38 AM    HEMOGLOBIN 9.4 (L) 03/28/2021 04:38 AM    HEMATOCRIT 30.5 (L) 03/28/2021 04:38 AM    MCV 89.7 03/28/2021 04:38 AM    MCH 27.6 03/28/2021 04:38 AM    MCHC 30.8 (L) 03/28/2021 04:38 AM    RDW 48.0 03/28/2021 04:38 AM    PLATELETCT 275 03/28/2021 04:38 AM    MPV 9.8 03/28/2021 04:38 AM    NEUTSPOLYS 75.50 (H) 03/27/2021 03:52 PM    LYMPHOCYTES 16.50 (L) 03/27/2021 03:52 PM    MONOCYTES 7.10 03/27/2021 03:52 PM    EOSINOPHILS 0.20 03/27/2021 03:52 PM    BASOPHILS 0.20 03/27/2021 03:52 PM    IMMGRAN 0.50 03/27/2021 03:52 PM    NRBC 0.00 03/27/2021 03:52 PM    NEUTS 11.52 (H) 03/27/2021 03:52 PM    LYMPHS 2.52 03/27/2021 03:52 PM    MONOS 1.08 (H) 03/27/2021 03:52 PM    EOS 0.03 03/27/2021 03:52 PM    BASO 0.03 03/27/2021 03:52 PM    IMMGRANAB 0.08 03/27/2021 03:52 PM    NRBCAB 0.00 03/27/2021 03:52 PM        CBC w/o DIFF  Lab Results   Component Value Date/Time    WBC 18.1 (H) 03/28/2021 04:38 AM    RBC 3.40 (L) 03/28/2021 04:38 AM    HEMOGLOBIN 9.4 (L) 03/28/2021 04:38 AM    MCV 89.7 03/28/2021 04:38 AM    MCH 27.6 03/28/2021 04:38 AM    MCHC 30.8 (L) 03/28/2021 04:38 AM    RDW 48.0 03/28/2021 04:38 AM    MPV 9.8 03/28/2021 04:38 AM       Prior echo/stress results reviewed: Normal EF in 2010     EKG interpreted by me: normal sinus rhythm    Impression/Plan:  1.  Shortness of breath, postpartum  2.  Sinus tachycardia    -We will check repeat echocardiogram, I will attempt to move up this date, reordering  -Would also check  with primary care doctor and OB/GYN to rule out other postpartum cause of shortness of breath  -It does not seem like her sinus tachycardia is terribly symptomatic. -Metoprolol can be uptitrated, will check echo first however.  -Overall, does not need to follow-up with EP MD.  General cardiology [facility] for management of hypertension, EP APN can follow-up as needed    Andres Montelongo MD  Cardiac Electrophysiology

## 2021-06-15 ENCOUNTER — TELEPHONE (OUTPATIENT)
Dept: CARDIOLOGY | Facility: MEDICAL CENTER | Age: 46
End: 2021-06-15

## 2021-06-15 NOTE — TELEPHONE ENCOUNTER
FOREST    Received call from Kathleen at [facility] pt is scheduled for an Echo on 6/17 but they did not receive the Order. Kathleen is requesting us fax it to [phone]  Please call back if you have any further questions at [phone] Ext: [phone].    Thank you.

## 2021-08-27 ENCOUNTER — OFFICE VISIT (OUTPATIENT)
Dept: CARDIOLOGY | Facility: MEDICAL CENTER | Age: 46
End: 2021-08-27
Payer: COMMERCIAL

## 2021-08-27 ENCOUNTER — TELEPHONE (OUTPATIENT)
Dept: CARDIOLOGY | Facility: MEDICAL CENTER | Age: 46
End: 2021-08-27

## 2021-08-27 VITALS
BODY MASS INDEX: 41.92 KG/M2 | SYSTOLIC BLOOD PRESSURE: 110 MMHG | HEIGHT: 63 IN | RESPIRATION RATE: 16 BRPM | OXYGEN SATURATION: 99 % | HEART RATE: 73 BPM | DIASTOLIC BLOOD PRESSURE: 70 MMHG | WEIGHT: 236.6 LBS

## 2021-08-27 DIAGNOSIS — I47.11 INAPPROPRIATE SINUS TACHYCARDIA (HCC): ICD-10-CM

## 2021-08-27 DIAGNOSIS — R00.2 PALPITATIONS: ICD-10-CM

## 2021-08-27 DIAGNOSIS — E66.3 OVERWEIGHT: ICD-10-CM

## 2021-08-27 PROBLEM — O10.919 CHRONIC HYPERTENSION AFFECTING PREGNANCY: Status: RESOLVED | Noted: 2021-03-29 | Resolved: 2021-08-27

## 2021-08-27 PROCEDURE — 99214 OFFICE O/P EST MOD 30 MIN: CPT | Performed by: NURSE PRACTITIONER

## 2021-08-27 ASSESSMENT — ENCOUNTER SYMPTOMS
PALPITATIONS: 0
COUGH: 0
MYALGIAS: 0
CLAUDICATION: 0
PND: 0
FEVER: 0
SHORTNESS OF BREATH: 0
ORTHOPNEA: 0
NERVOUS/ANXIOUS: 0
ABDOMINAL PAIN: 0

## 2021-08-27 ASSESSMENT — FIBROSIS 4 INDEX: FIB4 SCORE: 0.74

## 2021-08-27 NOTE — PROGRESS NOTES
Subjective:   Bela Gonzalez is a 39 y.o. female who presents today for follow up on tachycardia.    She is a patient of Dr. Sullivan, previous Dr. Bellamy patient.    Hx of palpitations with inappropriate sinus tachycardia, gestational diabetes, and obesity.    She presents today with her  and 5 month old daughter.    She was actually feeling better with no concerns except for de-conditioning with exertional activity. She doesn't exercise on a regular basis and knows she needs to work on weight loss.    She has been on metoprolol for almost 8 years now. She saw EP with no further recommendations other than echocardiogram (which we can't find the results to-will obtain) and continue bb therapy.    She has no lower extremity edema, orthopnea, chest pain, or lightheadedness.    Past Medical History:   Diagnosis Date   • Diabetes     gestational   • Elevated WBC count    • Heart burn    • Inappropriate sinus node tachycardia    • Indigestion    • Migraine    • Pain 01/07/15    Back=chronic(scoliosis)>4/10   • Scoliosis      Past Surgical History:   Procedure Laterality Date   • CHOLECYSTECTOMY  2016   • BETH BY LAPAROSCOPY N/A 1/15/2015    Procedure: BETH BY LAPAROSCOPY;  Surgeon: Hima Joseph M.D.;  Location: SURGERY [facility];  Service:    • RECOVERY  5/23/2012    Performed by SURGERY, IR-RECOVERY at SURGERY SAME DAY [facility] ORS   • OTHER      wisdom teeth     Family History   Problem Relation Age of Onset   • Diabetes Mother    • Asthma Sister      Social History     Tobacco Use   Smoking Status Never Smoker   Smokeless Tobacco Never Used     Allergies   Allergen Reactions   • Azithromycin      Z-pack- per pt causes severe diarrhea        Outpatient Encounter Medications as of 8/27/2021   Medication Sig Dispense Refill   • metoprolol tartrate (LOPRESSOR) 25 MG Tab Take 1 Tablet by mouth 2 times a day. 180 Tablet 3   • topiramate (TOPAMAX) 25 MG Tab 25 mg every day.     • ibuprofen (MOTRIN)  "600 MG Tab Take 1 tablet by mouth every 6 hours as needed for Moderate Pain. 30 tablet 0   • loratadine (CLARITIN) 10 MG Tab Take 10 mg by mouth every day.     • Esomeprazole Magnesium (NEXIUM) 20 MG Pack Take 20 mg by mouth.     • Omega-3 Fatty Acids (FISH OIL PO) Take  by mouth.     • CALCIUM PO Take  by mouth.     • diphenhydrAMINE HCl (BENADRYL ALLERGY PO) Take  by mouth at bedtime as needed.     • MULTIPLE VITAMIN PO Take 1 Tab by mouth every day. Indications: Prenantal     • citalopram (CELEXA) 20 MG Tab Take 20 mg by mouth every day.     • [DISCONTINUED] metoprolol tartrate (LOPRESSOR) 25 MG Tab Take 1 tablet by mouth 2 times a day. 60 tablet 11     No facility-administered encounter medications on file as of 8/27/2021.     Review of Systems   Constitutional: Negative for fever and malaise/fatigue.   Respiratory: Negative for cough and shortness of breath.    Cardiovascular: Negative for chest pain, palpitations, orthopnea, claudication, leg swelling and PND.   Gastrointestinal: Negative for abdominal pain.   Musculoskeletal: Negative for myalgias.   Neurological: Weakness:    Psychiatric/Behavioral: The patient is not nervous/anxious.    All other systems reviewed and are negative.       Objective:   /70 (BP Location: Left arm, Patient Position: Sitting, BP Cuff Size: Adult)   Pulse 73   Resp 16   Ht 1.6 m (5' 3\")   Wt 107 kg (236 lb 9.6 oz)   SpO2 99%   BMI 41.91 kg/m²     Physical Exam  Vitals and nursing note reviewed.   Constitutional:       General: She is not in acute distress.     Appearance: Normal appearance. She is well-developed. She is obese.      Comments:      HENT:      Head: Normocephalic and atraumatic.   Eyes:      Extraocular Movements: Extraocular movements intact.   Neck:      Vascular: No JVD.   Cardiovascular:      Rate and Rhythm: Normal rate and regular rhythm.      Pulses: Normal pulses.      Heart sounds: Normal heart sounds. No murmur heard.     Pulmonary:      Effort: " Pulmonary effort is normal. No respiratory distress.      Breath sounds: Normal breath sounds.   Abdominal:      General: Bowel sounds are normal.      Palpations: Abdomen is soft.      Comments: 19 weeks pregnant   Musculoskeletal:         General: Normal range of motion.      Cervical back: Normal range of motion.   Skin:     General: Skin is warm and dry.      Capillary Refill: Capillary refill takes less than 2 seconds.   Neurological:      General: No focal deficit present.      Mental Status: She is alert and oriented to person, place, and time. Mental status is at baseline.   Psychiatric:         Mood and Affect: Mood normal.         Behavior: Behavior normal.         Thought Content: Thought content normal.         Judgment: Judgment normal.       Assessment:     1. Overweight     2. Palpitations     3. Inappropriate sinus tachycardia       Medical Decision Making:  Today's Assessment / Status / Plan:     1. Palpitations from inappropriate sinus tachycardia  -cont metoprolol tartrate 25 mg BID  -palpitations improved  -echo in '10 unremarkable, repeat echo complete (obtain results from [facility])  -EP consultation completed, cont bb therapy, no further recommendations    2. Overweight   -lifestyle modifications  -cont with dietary changes and start light walking program     Patient is to follow up with Bela WALLIS in 1 year with labs, medications refilled

## 2021-09-23 ENCOUNTER — OFFICE VISIT (OUTPATIENT)
Dept: URGENT CARE | Facility: PHYSICIAN GROUP | Age: 46
End: 2021-09-23
Payer: COMMERCIAL

## 2021-09-23 ENCOUNTER — HOSPITAL ENCOUNTER (OUTPATIENT)
Facility: MEDICAL CENTER | Age: 46
End: 2021-09-23
Attending: NURSE PRACTITIONER
Payer: COMMERCIAL

## 2021-09-23 VITALS
SYSTOLIC BLOOD PRESSURE: 130 MMHG | OXYGEN SATURATION: 99 % | DIASTOLIC BLOOD PRESSURE: 60 MMHG | HEART RATE: 102 BPM | BODY MASS INDEX: 40.75 KG/M2 | HEIGHT: 63 IN | WEIGHT: 230 LBS | RESPIRATION RATE: 16 BRPM | TEMPERATURE: 98 F

## 2021-09-23 DIAGNOSIS — J02.9 SORE THROAT: ICD-10-CM

## 2021-09-23 DIAGNOSIS — Z20.822 CLOSE EXPOSURE TO COVID-19 VIRUS: ICD-10-CM

## 2021-09-23 DIAGNOSIS — H92.03 OTALGIA OF BOTH EARS: ICD-10-CM

## 2021-09-23 DIAGNOSIS — R09.81 NASAL CONGESTION: ICD-10-CM

## 2021-09-23 LAB — COVID ORDER STATUS COVID19: NORMAL

## 2021-09-23 PROCEDURE — 99213 OFFICE O/P EST LOW 20 MIN: CPT | Mod: CS | Performed by: NURSE PRACTITIONER

## 2021-09-23 PROCEDURE — U0003 INFECTIOUS AGENT DETECTION BY NUCLEIC ACID (DNA OR RNA); SEVERE ACUTE RESPIRATORY SYNDROME CORONAVIRUS 2 (SARS-COV-2) (CORONAVIRUS DISEASE [COVID-19]), AMPLIFIED PROBE TECHNIQUE, MAKING USE OF HIGH THROUGHPUT TECHNOLOGIES AS DESCRIBED BY CMS-2020-01-R: HCPCS

## 2021-09-23 PROCEDURE — U0005 INFEC AGEN DETEC AMPLI PROBE: HCPCS

## 2021-09-23 ASSESSMENT — FIBROSIS 4 INDEX: FIB4 SCORE: 0.74

## 2021-09-23 ASSESSMENT — ENCOUNTER SYMPTOMS
COUGH: 1
CARDIOVASCULAR NEGATIVE: 1
EYES NEGATIVE: 1
SORE THROAT: 1
NEUROLOGICAL NEGATIVE: 1
MUSCULOSKELETAL NEGATIVE: 1
GASTROINTESTINAL NEGATIVE: 1
CONSTITUTIONAL NEGATIVE: 1
PSYCHIATRIC NEGATIVE: 1

## 2021-09-23 NOTE — PROGRESS NOTES
Subjective:   Bela Gonzalez is a 45 y.o. female who presents for Coronavirus Screening (started monday, congestion, sore throat )       HPI  Pt presents for evaluation of a new problem, reports 4-day history of congestion, dry cough, ear pain, and sore throat.  Patient was exposed to Covid.  Her daughter at her .  Patient's daughter is here with similar symptoms.  Patient is fully vaccinated.    Review of Systems   Constitutional: Negative.    HENT: Positive for congestion and sore throat.    Eyes: Negative.    Respiratory: Positive for cough.    Cardiovascular: Negative.    Gastrointestinal: Negative.    Genitourinary: Negative.    Musculoskeletal: Negative.    Skin: Negative.    Neurological: Negative.    Psychiatric/Behavioral: Negative.    All other systems reviewed and are negative.      MEDS:   Current Outpatient Medications:   •  metoprolol tartrate (LOPRESSOR) 25 MG Tab, Take 1 Tablet by mouth 2 times a day., Disp: 180 Tablet, Rfl: 3  •  topiramate (TOPAMAX) 25 MG Tab, 25 mg every day., Disp: , Rfl:   •  ibuprofen (MOTRIN) 600 MG Tab, Take 1 tablet by mouth every 6 hours as needed for Moderate Pain., Disp: 30 tablet, Rfl: 0  •  loratadine (CLARITIN) 10 MG Tab, Take 10 mg by mouth every day., Disp: , Rfl:   •  Esomeprazole Magnesium (NEXIUM) 20 MG Pack, Take 20 mg by mouth., Disp: , Rfl:   •  Omega-3 Fatty Acids (FISH OIL PO), Take  by mouth., Disp: , Rfl:   •  CALCIUM PO, Take  by mouth., Disp: , Rfl:   •  diphenhydrAMINE HCl (BENADRYL ALLERGY PO), Take  by mouth at bedtime as needed., Disp: , Rfl:   •  MULTIPLE VITAMIN PO, Take 1 Tab by mouth every day. Indications: Prenantal, Disp: , Rfl:   •  citalopram (CELEXA) 20 MG Tab, Take 20 mg by mouth every day., Disp: , Rfl:   ALLERGIES:   Allergies   Allergen Reactions   • Azithromycin      Z-pack- per pt causes severe diarrhea          Patient's PMH, SocHx, SurgHx, FamHx, Drug allergies and medications were reviewed.     Objective:   /60  "(BP Location: Left arm, Patient Position: Sitting, BP Cuff Size: Large adult)   Pulse (!) 102   Temp 36.7 °C (98 °F) (Temporal)   Resp 16   Ht 1.6 m (5' 3\")   Wt 104 kg (230 lb)   SpO2 99%   BMI 40.74 kg/m²     Physical Exam  Vitals and nursing note reviewed.   Constitutional:       General: She is awake.      Appearance: Normal appearance. She is well-developed and normal weight.   HENT:      Head: Normocephalic and atraumatic.      Right Ear: Tympanic membrane, ear canal and external ear normal.      Left Ear: Tympanic membrane, ear canal and external ear normal.      Nose: Nose normal.      Mouth/Throat:      Lips: Pink.      Mouth: Mucous membranes are moist.      Pharynx: Oropharynx is clear. Uvula midline.   Eyes:      Extraocular Movements: Extraocular movements intact.      Conjunctiva/sclera: Conjunctivae normal.      Pupils: Pupils are equal, round, and reactive to light.   Neck:      Thyroid: No thyromegaly.      Trachea: Trachea normal.   Cardiovascular:      Rate and Rhythm: Normal rate and regular rhythm.      Pulses: Normal pulses.      Heart sounds: Normal heart sounds, S1 normal and S2 normal.   Pulmonary:      Effort: Pulmonary effort is normal. No respiratory distress.      Breath sounds: Normal breath sounds. No wheezing, rhonchi or rales.   Abdominal:      General: Bowel sounds are normal.      Palpations: Abdomen is soft.   Musculoskeletal:         General: Normal range of motion.      Cervical back: Full passive range of motion without pain, normal range of motion and neck supple.   Lymphadenopathy:      Cervical: No cervical adenopathy.   Skin:     General: Skin is warm and dry.      Capillary Refill: Capillary refill takes less than 2 seconds.   Neurological:      General: No focal deficit present.      Mental Status: She is alert and oriented to person, place, and time.      Gait: Gait is intact.   Psychiatric:         Attention and Perception: Attention and perception normal.        "  Mood and Affect: Mood normal.         Speech: Speech normal.         Behavior: Behavior normal. Behavior is cooperative.         Thought Content: Thought content normal.         Judgment: Judgment normal.         Assessment/Plan:   Assessment    1. Close exposure to COVID-19 virus  - SARS-CoV-2 PCR (24 hour In-House): Collect NP swab in VTM; Future    2. Nasal congestion  - SARS-CoV-2 PCR (24 hour In-House): Collect NP swab in VTM; Future    3. Sore throat  - SARS-CoV-2 PCR (24 hour In-House): Collect NP swab in VTM; Future    4. Otalgia of both ears  - SARS-CoV-2 PCR (24 hour In-House): Collect NP swab in VTM; Future    Vital signs stable at today's acute urgent care visit. Will obtain COVID testing.  Advised to home isolate until test results return.  Supportive care options also discussed, to include alternating Tylenol and Advil, cough/cold/flu OTC medications for symptomatic relief, in addition to rest, fluids as tolerated and deep breathing exercises.  Differential diagnosis, natural history, and indications for immediate follow-up were discussed.     Advised the patient to follow-up with the primary care provider for recheck, reevaluation, and/or consideration of further management if necessary. Return to urgent care with any worsening symptoms or if there is no improvement in their current condition. Red flags discussed and indications to immediately call 911 or present to the Emergency Department.  All questions were encouraged and answered to the patient's satisfaction and understanding, and they agree to the plan of care.     I personally reviewed prior external notes and test results pertinent to today's visit.  I have independently reviewed and interpreted all diagnostics ordered during this urgent care acute visit. Time spent evaluating this patient was a minimum of 30 minutes and includes preparing for visit, counseling/education, exam and evaluation, obtaining history, independent interpretation and  ordering lab/test/procedures.      Please note that this dictation was created using voice recognition software. I have made a reasonable attempt to correct obvious errors, but I expect that there are errors of grammar and possibly content that I did not discover before finalizing the note.

## 2021-09-23 NOTE — LETTER
September 23, 2021        Bela Gonzalez    Your employee/student was seen in our clinic today.  A concern for COVID-19 has been identified and testing is in progress.     We are asking you to excuse absences while following self-isolation protocol per Center for Disease Control (CDC) guidelines.  Your employee/student (and/or their parent) will be able to access test results through our electronic delivery system called [employer].     If the results of testing are negative, and once there has been no fever (temperature >100.4 F) for at least 72 hours without treatment, and no vomiting or diarrhea for at least 48 hours, then return to work/school is approved.    If the results of testing are positive then your employee/student will be contacted by the Critical access hospital or [facility] department for further instructions on duration of self-isolation and return to work protocol. In general, this will also follow the CDC guidelines with a minimum of 10 days from the onset of symptoms and without fever, vomiting, or diarrhea as above.     In general, repeat testing is not necessary and not offered through our [facility].     This is the only note that will be provided from [facility] for this visit.  Your employee/student will require an appointment with a primary care provider if FMLA or disability forms are required.    If you have any questions please do not hesitate to call me at the phone number listed below.    Sincerely,      Faith Torres, APRSKYLER  [phone]  Electronically Signed

## 2021-09-24 LAB
SARS-COV-2 RNA RESP QL NAA+PROBE: NOTDETECTED
SPECIMEN SOURCE: NORMAL

## 2022-09-02 ENCOUNTER — HOSPITAL ENCOUNTER (OUTPATIENT)
Dept: LAB | Facility: MEDICAL CENTER | Age: 47
End: 2022-09-02
Attending: FAMILY MEDICINE
Payer: COMMERCIAL

## 2022-09-02 LAB
25(OH)D3 SERPL-MCNC: 27 NG/ML (ref 30–100)
ALBUMIN SERPL BCP-MCNC: 4 G/DL (ref 3.2–4.9)
ALBUMIN/GLOB SERPL: 1.4 G/DL
ALP SERPL-CCNC: 57 U/L (ref 30–99)
ALT SERPL-CCNC: 14 U/L (ref 2–50)
ANION GAP SERPL CALC-SCNC: 11 MMOL/L (ref 7–16)
AST SERPL-CCNC: 18 U/L (ref 12–45)
BASOPHILS # BLD AUTO: 0.5 % (ref 0–1.8)
BASOPHILS # BLD: 0.05 K/UL (ref 0–0.12)
BILIRUB SERPL-MCNC: 0.3 MG/DL (ref 0.1–1.5)
BUN SERPL-MCNC: 17 MG/DL (ref 8–22)
CALCIUM SERPL-MCNC: 9.2 MG/DL (ref 8.5–10.5)
CHLORIDE SERPL-SCNC: 109 MMOL/L (ref 96–112)
CHOLEST SERPL-MCNC: 185 MG/DL (ref 100–199)
CO2 SERPL-SCNC: 20 MMOL/L (ref 20–33)
CREAT SERPL-MCNC: 0.88 MG/DL (ref 0.5–1.4)
EOSINOPHIL # BLD AUTO: 0.27 K/UL (ref 0–0.51)
EOSINOPHIL NFR BLD: 2.8 % (ref 0–6.9)
ERYTHROCYTE [DISTWIDTH] IN BLOOD BY AUTOMATED COUNT: 45.3 FL (ref 35.9–50)
EST. AVERAGE GLUCOSE BLD GHB EST-MCNC: 120 MG/DL
FASTING STATUS PATIENT QL REPORTED: NORMAL
GFR SERPLBLD CREATININE-BSD FMLA CKD-EPI: 83 ML/MIN/1.73 M 2
GLOBULIN SER CALC-MCNC: 2.9 G/DL (ref 1.9–3.5)
GLUCOSE SERPL-MCNC: 86 MG/DL (ref 65–99)
HBA1C MFR BLD: 5.8 % (ref 4–5.6)
HCT VFR BLD AUTO: 44.2 % (ref 37–47)
HDLC SERPL-MCNC: 48 MG/DL
HGB BLD-MCNC: 14.2 G/DL (ref 12–16)
IMM GRANULOCYTES # BLD AUTO: 0.03 K/UL (ref 0–0.11)
IMM GRANULOCYTES NFR BLD AUTO: 0.3 % (ref 0–0.9)
LDLC SERPL CALC-MCNC: 112 MG/DL
LYMPHOCYTES # BLD AUTO: 3.28 K/UL (ref 1–4.8)
LYMPHOCYTES NFR BLD: 34.3 % (ref 22–41)
MCH RBC QN AUTO: 28.9 PG (ref 27–33)
MCHC RBC AUTO-ENTMCNC: 32.1 G/DL (ref 33.6–35)
MCV RBC AUTO: 89.8 FL (ref 81.4–97.8)
MONOCYTES # BLD AUTO: 0.73 K/UL (ref 0–0.85)
MONOCYTES NFR BLD AUTO: 7.6 % (ref 0–13.4)
NEUTROPHILS # BLD AUTO: 5.21 K/UL (ref 2–7.15)
NEUTROPHILS NFR BLD: 54.5 % (ref 44–72)
NRBC # BLD AUTO: 0 K/UL
NRBC BLD-RTO: 0 /100 WBC
PLATELET # BLD AUTO: 359 K/UL (ref 164–446)
PMV BLD AUTO: 9.6 FL (ref 9–12.9)
POTASSIUM SERPL-SCNC: 4.4 MMOL/L (ref 3.6–5.5)
PROT SERPL-MCNC: 6.9 G/DL (ref 6–8.2)
RBC # BLD AUTO: 4.92 M/UL (ref 4.2–5.4)
SODIUM SERPL-SCNC: 140 MMOL/L (ref 135–145)
T3FREE SERPL-MCNC: 2.9 PG/ML (ref 2–4.4)
T4 FREE SERPL-MCNC: 1.02 NG/DL (ref 0.93–1.7)
TRIGL SERPL-MCNC: 123 MG/DL (ref 0–149)
TSH SERPL DL<=0.005 MIU/L-ACNC: 2.71 UIU/ML (ref 0.38–5.33)
WBC # BLD AUTO: 9.6 K/UL (ref 4.8–10.8)

## 2022-09-02 PROCEDURE — 83036 HEMOGLOBIN GLYCOSYLATED A1C: CPT

## 2022-09-02 PROCEDURE — 84439 ASSAY OF FREE THYROXINE: CPT

## 2022-09-02 PROCEDURE — 80053 COMPREHEN METABOLIC PANEL: CPT

## 2022-09-02 PROCEDURE — 36415 COLL VENOUS BLD VENIPUNCTURE: CPT

## 2022-09-02 PROCEDURE — 82306 VITAMIN D 25 HYDROXY: CPT

## 2022-09-02 PROCEDURE — 84481 FREE ASSAY (FT-3): CPT

## 2022-09-02 PROCEDURE — 85025 COMPLETE CBC W/AUTO DIFF WBC: CPT

## 2022-09-02 PROCEDURE — 84443 ASSAY THYROID STIM HORMONE: CPT

## 2022-09-02 PROCEDURE — 86800 THYROGLOBULIN ANTIBODY: CPT

## 2022-09-02 PROCEDURE — 80061 LIPID PANEL: CPT

## 2022-09-06 LAB — THYROGLOB AB SERPL-ACNC: <0.9 IU/ML (ref 0–4)

## 2022-11-07 ENCOUNTER — OFFICE VISIT (OUTPATIENT)
Dept: URGENT CARE | Facility: PHYSICIAN GROUP | Age: 47
End: 2022-11-07
Payer: COMMERCIAL

## 2022-11-07 ENCOUNTER — HOSPITAL ENCOUNTER (OUTPATIENT)
Facility: MEDICAL CENTER | Age: 47
End: 2022-11-07
Payer: COMMERCIAL

## 2022-11-07 VITALS
HEIGHT: 63 IN | TEMPERATURE: 97.9 F | WEIGHT: 237.4 LBS | HEART RATE: 80 BPM | OXYGEN SATURATION: 99 % | BODY MASS INDEX: 42.06 KG/M2 | RESPIRATION RATE: 16 BRPM | DIASTOLIC BLOOD PRESSURE: 84 MMHG | SYSTOLIC BLOOD PRESSURE: 118 MMHG

## 2022-11-07 DIAGNOSIS — L02.91 ABSCESS: ICD-10-CM

## 2022-11-07 PROCEDURE — 87077 CULTURE AEROBIC IDENTIFY: CPT

## 2022-11-07 PROCEDURE — 99213 OFFICE O/P EST LOW 20 MIN: CPT

## 2022-11-07 PROCEDURE — 87205 SMEAR GRAM STAIN: CPT

## 2022-11-07 PROCEDURE — 87070 CULTURE OTHR SPECIMN AEROBIC: CPT

## 2022-11-07 RX ORDER — METFORMIN HYDROCHLORIDE 500 MG/1
TABLET, EXTENDED RELEASE ORAL
COMMUNITY
Start: 2022-09-02 | End: 2023-02-24

## 2022-11-07 RX ORDER — SULFAMETHOXAZOLE AND TRIMETHOPRIM 800; 160 MG/1; MG/1
1 TABLET ORAL 2 TIMES DAILY
Qty: 14 TABLET | Refills: 0 | Status: SHIPPED | OUTPATIENT
Start: 2022-11-07 | End: 2022-11-14

## 2022-11-07 RX ORDER — CITALOPRAM 40 MG/1
TABLET ORAL
COMMUNITY
Start: 2022-08-21

## 2022-11-07 ASSESSMENT — FIBROSIS 4 INDEX: FIB4 SCORE: 0.59

## 2022-11-08 DIAGNOSIS — L02.91 ABSCESS: ICD-10-CM

## 2022-11-08 LAB
GRAM STN SPEC: NORMAL
SIGNIFICANT IND 70042: NORMAL
SITE SITE: NORMAL
SOURCE SOURCE: NORMAL

## 2022-11-08 NOTE — PROGRESS NOTES
Subjective:   Bela Gonzalez is a 46 y.o. female who presents for Bump (Boil located in pelvic area, tender, redness, x1 week )      HPI: This is a 46-year-old female who presents today for abscess.  Patient reports developing 2 abscesses to her right lower abdomen approximately 1 week ago.  She reports 1 abscess spontaneously drained a few days ago, and second spontaneously draining today.  She reports cleaning area daily with hydrogen peroxide and applying Neosporin.  She describes drainage as purulent with blood-tinged.  She reports area is mildly tender.  She denies fevers, chills, body aches.        ROS per HPI    Medications:    Current Outpatient Medications on File Prior to Visit   Medication Sig Dispense Refill    metFORMIN ER (GLUCOPHAGE XR) 500 MG TABLET SR 24 HR       citalopram (CELEXA) 40 MG Tab       metoprolol tartrate (LOPRESSOR) 25 MG Tab TAKE ONE TABLET BY MOUTH TWICE A  Tablet 0    topiramate (TOPAMAX) 25 MG Tab 1 Tablet every day.      ibuprofen (MOTRIN) 600 MG Tab Take 1 tablet by mouth every 6 hours as needed for Moderate Pain. 30 tablet 0    loratadine (CLARITIN) 10 MG Tab Take 1 Tablet by mouth every day.      Esomeprazole Magnesium 20 MG Pack Take 20 mg by mouth.      Omega-3 Fatty Acids (FISH OIL PO) Take  by mouth.      CALCIUM PO Take  by mouth.      diphenhydrAMINE HCl (BENADRYL ALLERGY PO) Take  by mouth at bedtime as needed.      MULTIPLE VITAMIN PO Take 1 Tab by mouth every day. Indications: Prenantal      citalopram (CELEXA) 20 MG Tab Take 1 Tablet by mouth every day.       No current facility-administered medications on file prior to visit.        Allergies:   Azithromycin    Problem List:   Patient Active Problem List   Diagnosis    Inappropriate sinus tachycardia    FHx: migraine headaches    Overweight    Methamphetamine use, remote    Palpitations    Elevated glucose    Other specified disorder of gallbladder     (spontaneous vaginal delivery)    Acute blood  "loss as cause of postoperative anemia    Gestational diabetes mellitus (GDM) in third trimester        Surgical History:  Past Surgical History:   Procedure Laterality Date    CHOLECYSTECTOMY  2016    BETH BY LAPAROSCOPY N/A 1/15/2015    Procedure: BETH BY LAPAROSCOPY;  Surgeon: Hima Joseph M.D.;  Location: SURGERY [facility];  Service:     RECOVERY  5/23/2012    Performed by SURGERY, IR-RECOVERY at [facility]ERY [facility]    OTHER      wisdom teeth       Past Social Hx:   Social History     Tobacco Use    Smoking status: Never    Smokeless tobacco: Never   Vaping Use    Vaping Use: Never used   Substance Use Topics    Alcohol use: No    Drug use: No     Comment: quit Meth 9 years ago          Problem list, medications, and allergies reviewed by myself today in Epic.     Objective:     /84 (BP Location: Right arm, Patient Position: Sitting, BP Cuff Size: Adult)   Pulse 80   Temp 36.6 °C (97.9 °F) (Temporal)   Resp 16   Ht 1.6 m (5' 3\")   Wt 108 kg (237 lb 6.4 oz)   SpO2 99%   BMI 42.05 kg/m²     Physical Exam  Vitals and nursing note reviewed.   Constitutional:       General: She is awake. She is not in acute distress.     Appearance: Normal appearance. She is not ill-appearing, toxic-appearing or diaphoretic.   HENT:      Head: Normocephalic and atraumatic.   Skin:     General: Skin is warm and dry.      Capillary Refill: Capillary refill takes less than 2 seconds.             Comments: Area of induration identified with 1 actively draining abscess approximately 0.5 cm with mild surrounding erythema.  Sanguinous drainage present. No area of fluctuance identified.     Neurological:      General: No focal deficit present.      Mental Status: She is alert.   Psychiatric:         Mood and Affect: Mood normal.         Behavior: Behavior normal. Behavior is cooperative.         Thought Content: Thought content normal.         Judgment: Judgment normal.       Assessment/Plan:     Diagnosis and " associated orders:   1. Abscess  CULTURE WOUND W/ GRAM STAIN    sulfamethoxazole-trimethoprim (BACTRIM DS) 800-160 MG tablet             Comments/MDM:   Pt is clinically stable at today's acute urgent care visit.  No acute distress noted. Appropriate for outpatient management at this time.     Acute problem.  Patient is not ill or toxic appearing in clinic today.  Vital signs are stable, she is afebrile.  Area of induration identified with actively draining abscess, with mild surrounding erythema. Wound culture obtained, wound redressed. I have advised patient to keep area clean and dry with gentle soap and water and cover with non adherent bandage while draining. I have recommended that she discontinue use of hydrogen peroxide. Patient will be placed on bactrim for 7 days. I will follow up with wound culture results and adjust antibiotics if needed. I have advised patient to return to UC for any new or worsening s/s, or if abscess fails to improve. Patient verbalizes good understanding.          Discussed DDx, management options (risks,benefits, and alternatives to planned treatment), natural progression and supportive care.  Expressed understanding and the treatment plan was agreed upon. Questions were encouraged and answered   Return to urgent care prn if new or worsening sx or if there is no improvement in condition prn.    Educated in Red flags and indications to immediately call 911 or present to the Emergency Department.   Advised the patient to follow-up with the primary care physician for recheck, reevaluation, and consideration of further management.    I personally reviewed prior external notes and test results pertinent to today's visit.  I have independently reviewed and interpreted all diagnostics ordered during this urgent care acute visit.     Please note that this dictation was created using voice recognition software. I have made a reasonable attempt to correct obvious errors, but I expect that  there are errors of grammar and possibly content that I did not discover before finalizing the note.    This note was electronically signed by BIMAL Jacobs

## 2022-11-10 DIAGNOSIS — L02.91 ABSCESS: ICD-10-CM

## 2022-11-10 LAB
BACTERIA WND AEROBE CULT: ABNORMAL
BACTERIA WND AEROBE CULT: ABNORMAL
GRAM STN SPEC: ABNORMAL
SIGNIFICANT IND 70042: ABNORMAL
SITE SITE: ABNORMAL
SOURCE SOURCE: ABNORMAL

## 2022-11-10 RX ORDER — CEPHALEXIN 500 MG/1
1000 CAPSULE ORAL 3 TIMES DAILY
Qty: 30 CAPSULE | Refills: 0 | Status: SHIPPED | OUTPATIENT
Start: 2022-11-10 | End: 2022-11-15

## 2022-11-10 NOTE — PROGRESS NOTES
11/10/22: Patient wound culture is positive for beta hemolytic strep group A. Antibiotics will be changed to cover this antibiotic. Patient does report some improvement. I have advised her to stop previously prescribed antibiotics and begin taking new antibiotics as prescribed. She is to return to  for and new, worsening, or s/s that fail to improve. Patient verbalized good understanding.

## 2022-12-28 DIAGNOSIS — R00.2 PALPITATIONS: ICD-10-CM

## 2022-12-28 NOTE — TELEPHONE ENCOUNTER
Is the patient due for a refill? Yes    Was the patient seen the past year? No    Date of last office visit: 8/27/2021    Does the patient have an upcoming appointment?  Yes   If yes, When? 12/29/2022    Provider to refill:SC    Does the patients insurance require a 100 day supply?  No

## 2022-12-29 ENCOUNTER — APPOINTMENT (OUTPATIENT)
Dept: CARDIOLOGY | Facility: MEDICAL CENTER | Age: 47
End: 2022-12-29
Payer: COMMERCIAL

## 2022-12-30 ENCOUNTER — APPOINTMENT (OUTPATIENT)
Dept: CARDIOLOGY | Facility: MEDICAL CENTER | Age: 47
End: 2022-12-30
Payer: COMMERCIAL

## 2022-12-31 ENCOUNTER — OFFICE VISIT (OUTPATIENT)
Dept: URGENT CARE | Facility: PHYSICIAN GROUP | Age: 47
End: 2022-12-31
Payer: COMMERCIAL

## 2022-12-31 VITALS
TEMPERATURE: 97.2 F | DIASTOLIC BLOOD PRESSURE: 72 MMHG | WEIGHT: 240 LBS | SYSTOLIC BLOOD PRESSURE: 114 MMHG | BODY MASS INDEX: 42.51 KG/M2 | OXYGEN SATURATION: 99 % | HEART RATE: 95 BPM

## 2022-12-31 DIAGNOSIS — H10.33 ACUTE BACTERIAL CONJUNCTIVITIS OF BOTH EYES: ICD-10-CM

## 2022-12-31 DIAGNOSIS — H66.002 NON-RECURRENT ACUTE SUPPURATIVE OTITIS MEDIA OF LEFT EAR WITHOUT SPONTANEOUS RUPTURE OF TYMPANIC MEMBRANE: ICD-10-CM

## 2022-12-31 PROCEDURE — 99213 OFFICE O/P EST LOW 20 MIN: CPT | Performed by: PHYSICIAN ASSISTANT

## 2022-12-31 RX ORDER — POLYMYXIN B SULFATE AND TRIMETHOPRIM 1; 10000 MG/ML; [USP'U]/ML
1 SOLUTION OPHTHALMIC EVERY 4 HOURS
Qty: 4 ML | Refills: 0 | Status: SHIPPED | OUTPATIENT
Start: 2022-12-31 | End: 2023-01-10

## 2022-12-31 RX ORDER — AMOXICILLIN 500 MG/1
500 CAPSULE ORAL 2 TIMES DAILY
Qty: 14 CAPSULE | Refills: 0 | Status: SHIPPED | OUTPATIENT
Start: 2022-12-31 | End: 2023-01-07

## 2022-12-31 ASSESSMENT — ENCOUNTER SYMPTOMS
MYALGIAS: 0
FEVER: 0
EYE REDNESS: 1
SHORTNESS OF BREATH: 0
CONSTIPATION: 0
ABDOMINAL PAIN: 0
VOMITING: 0
BLURRED VISION: 0
CHILLS: 0
PHOTOPHOBIA: 0
HEADACHES: 0
SORE THROAT: 0
DOUBLE VISION: 0
NAUSEA: 0
EYE PAIN: 0
EYE DISCHARGE: 1
COUGH: 0
DIARRHEA: 0

## 2022-12-31 ASSESSMENT — FIBROSIS 4 INDEX: FIB4 SCORE: 0.6

## 2022-12-31 NOTE — PROGRESS NOTES
Subjective:   Bela Gonzalez is a 47 y.o. female who presents for Otalgia (Left )      47-year-old female notes a cute onset of left ear pain worse today but slowly progressing over the last week.  Has also had congestion and nonspecific URI.  She also notes bilateral eye discharge and matting starting this morning.  Does not wear contact lenses, does wear glasses, denies any changes to visual acuities    Review of Systems   Constitutional:  Negative for chills and fever.   HENT:  Positive for congestion and ear pain. Negative for sore throat.    Eyes:  Positive for discharge and redness. Negative for blurred vision, double vision, photophobia and pain.   Respiratory:  Negative for cough and shortness of breath.    Cardiovascular:  Negative for chest pain.   Gastrointestinal:  Negative for abdominal pain, constipation, diarrhea, nausea and vomiting.   Genitourinary:  Negative for dysuria.   Musculoskeletal:  Negative for myalgias.   Skin:  Negative for rash.   Neurological:  Negative for headaches.     Medications, Allergies, and current problem list reviewed today in Epic.     Objective:     /72 (BP Location: Left arm, Patient Position: Sitting, BP Cuff Size: Adult)   Pulse 95   Temp 36.2 °C (97.2 °F)   Wt 109 kg (240 lb)   SpO2 99%     Physical Exam  Vitals reviewed.   Constitutional:       Appearance: Normal appearance.   HENT:      Head: Normocephalic and atraumatic.      Right Ear: Ear canal and external ear normal.      Left Ear: Ear canal and external ear normal.      Ears:      Comments: Trace erythema bulging left TM, right TM pearly gray     Nose: Nose normal.      Mouth/Throat:      Mouth: Mucous membranes are moist.   Eyes:      General:         Right eye: Discharge present.         Left eye: Discharge present.     Extraocular Movements: Extraocular movements intact.      Pupils: Pupils are equal, round, and reactive to light.      Comments: Trace injected conjunctiva   Cardiovascular:       Rate and Rhythm: Normal rate.   Pulmonary:      Effort: Pulmonary effort is normal.   Skin:     General: Skin is warm and dry.      Capillary Refill: Capillary refill takes less than 2 seconds.   Neurological:      Mental Status: She is alert and oriented to person, place, and time.       Assessment/Plan:     Diagnosis and associated orders:     1. Acute bacterial conjunctivitis of both eyes  amoxicillin (AMOXIL) 500 MG Cap      2. Non-recurrent acute suppurative otitis media of left ear without spontaneous rupture of tympanic membrane  polymixin-trimethoprim (POLYTRIM) 79539-0.1 UNIT/ML-% Solution         Comments/MDM:     Continue antihistamines, start nasal steroid, continue anti-inflammatories.  Continue current good ocular hygiene, use drops for 48 hours beyond symptomatic resolution         Differential diagnosis, natural history, supportive care, and indications for immediate follow-up discussed.    Advised the patient to follow-up with the primary care physician for recheck, reevaluation, and consideration of further management.    Please note that this dictation was created using voice recognition software. I have made a reasonable attempt to correct obvious errors, but I expect that there are errors of grammar and possibly content that I did not discover before finalizing the note.    This note was electronically signed by Vamshi Quiros PA-C

## 2023-01-16 ENCOUNTER — OFFICE VISIT (OUTPATIENT)
Dept: URGENT CARE | Facility: PHYSICIAN GROUP | Age: 48
End: 2023-01-16
Payer: COMMERCIAL

## 2023-01-16 VITALS
BODY MASS INDEX: 42.52 KG/M2 | TEMPERATURE: 97.6 F | HEART RATE: 85 BPM | DIASTOLIC BLOOD PRESSURE: 70 MMHG | WEIGHT: 240 LBS | RESPIRATION RATE: 16 BRPM | HEIGHT: 63 IN | SYSTOLIC BLOOD PRESSURE: 110 MMHG | OXYGEN SATURATION: 96 %

## 2023-01-16 DIAGNOSIS — R05.1 ACUTE COUGH: ICD-10-CM

## 2023-01-16 DIAGNOSIS — J01.00 ACUTE NON-RECURRENT MAXILLARY SINUSITIS: ICD-10-CM

## 2023-01-16 DIAGNOSIS — J06.9 VIRAL URI WITH COUGH: ICD-10-CM

## 2023-01-16 PROCEDURE — 99213 OFFICE O/P EST LOW 20 MIN: CPT | Performed by: PHYSICIAN ASSISTANT

## 2023-01-16 RX ORDER — AMOXICILLIN AND CLAVULANATE POTASSIUM 875; 125 MG/1; MG/1
1 TABLET, FILM COATED ORAL 2 TIMES DAILY
Qty: 14 TABLET | Refills: 0 | Status: SHIPPED | OUTPATIENT
Start: 2023-01-16 | End: 2023-01-23

## 2023-01-16 RX ORDER — DEXTROMETHORPHAN HYDROBROMIDE AND PROMETHAZINE HYDROCHLORIDE 15; 6.25 MG/5ML; MG/5ML
5 SYRUP ORAL EVERY 4 HOURS PRN
Qty: 118 ML | Refills: 0 | Status: SHIPPED
Start: 2023-01-16 | End: 2023-02-24

## 2023-01-16 RX ORDER — BENZONATATE 100 MG/1
100 CAPSULE ORAL 3 TIMES DAILY PRN
Qty: 20 CAPSULE | Refills: 0 | Status: SHIPPED
Start: 2023-01-16 | End: 2023-02-24

## 2023-01-16 ASSESSMENT — FIBROSIS 4 INDEX: FIB4 SCORE: 0.6

## 2023-01-16 NOTE — PROGRESS NOTES
"Subjective:   Bela Gonzalez is a 47 y.o. female who presents for Cough (Congestion, sob, x3 weeks )  Patient presents with chief complaint of greater than 2-week history of viral URI symptoms.  She was seen and evaluated on December 31, 2022 with left ear pain and diagnosed with otitis media as well as bilateral bacterial conjunctivitis.  She states she started to feel somewhat better but since has developed increasing sinus pressure, sinus pain, nasal congestion, cough.  She denies fevers or chills.  Home COVID test was negative.  She has no underlying respiratory illnesses.  She does report purulent nasal discharge.            Medications:  BENADRYL ALLERGY PO  CALCIUM PO  citalopram Tabs  Esomeprazole Magnesium Pack  FISH OIL PO  ibuprofen Tabs  loratadine Tabs  metFORMIN ER Tb24  metoprolol tartrate Tabs  MULTIPLE VITAMIN PO  topiramate Tabs    Allergies:             Azithromycin    Surgical History:         Past Surgical History:   Procedure Laterality Date    CHOLECYSTECTOMY  2016    BETH BY LAPAROSCOPY N/A 1/15/2015    Procedure: BETH BY LAPAROSCOPY;  Surgeon: Hima Joseph M.D.;  Location: SURGERY [facility];  Service:     RECOVERY  5/23/2012    Performed by SURGERY, IR-RECOVERY at SURGERY SAME DAY [facility] ORS    OTHER      wisdom teeth       Past Social Hx:  Bela Gonzalez  reports that she has never smoked. She has never used smokeless tobacco. She reports that she does not drink alcohol and does not use drugs.     Past Family Hx:   Bela Gonzalez family history includes Asthma in her sister; Diabetes in her mother.       Problem list, medications, and allergies reviewed by myself today in Epic.     Objective:     /70 (BP Location: Left arm, Patient Position: Sitting, BP Cuff Size: Adult)   Pulse 85   Temp 36.4 °C (97.6 °F) (Temporal)   Resp 16   Ht 1.6 m (5' 3\")   Wt 109 kg (240 lb)   SpO2 96%   BMI 42.51 kg/m²     Physical Exam  Vitals and nursing note " reviewed.   Constitutional:       General: She is not in acute distress.     Appearance: Normal appearance. She is well-developed. She is not ill-appearing or toxic-appearing.   HENT:      Head: Normocephalic.      Right Ear: External ear normal. No tenderness. A middle ear effusion is present. No mastoid tenderness. Tympanic membrane is injected. Tympanic membrane is not perforated or bulging. Tympanic membrane has decreased mobility.      Left Ear: External ear normal. No tenderness. A middle ear effusion is present. No mastoid tenderness. Tympanic membrane is injected. Tympanic membrane is not perforated or bulging. Tympanic membrane has decreased mobility.      Nose: Mucosal edema, congestion and rhinorrhea present.      Right Nostril: No foreign body.      Left Nostril: No foreign body.      Right Turbinates: Swollen.      Left Turbinates: Swollen.      Right Sinus: Maxillary sinus tenderness and frontal sinus tenderness present.      Left Sinus: Maxillary sinus tenderness and frontal sinus tenderness present.      Mouth/Throat:      Mouth: Mucous membranes are moist.      Pharynx: Uvula midline. Posterior oropharyngeal erythema present. No pharyngeal swelling, oropharyngeal exudate or uvula swelling.      Tonsils: No tonsillar exudate or tonsillar abscesses.      Comments: Mild pharyngeal edema.  No tonsillar exudate.  Eyes:      Extraocular Movements: Extraocular movements intact.      Pupils: Pupils are equal, round, and reactive to light.   Cardiovascular:      Rate and Rhythm: Normal rate and regular rhythm.      Pulses: Normal pulses.      Heart sounds: Normal heart sounds. No murmur heard.  Pulmonary:      Effort: Pulmonary effort is normal. No tachypnea or respiratory distress.      Breath sounds: Normal breath sounds and air entry. No stridor or decreased air movement. No decreased breath sounds, wheezing, rhonchi or rales.      Comments: Lungs are clear to auscultation bilaterally, no rhonchi rales or  wheezes  Chest:      Chest wall: No tenderness.   Musculoskeletal:      Cervical back: Normal range of motion. No rigidity.   Lymphadenopathy:      Cervical: No cervical adenopathy.   Neurological:      Mental Status: She is alert.   Psychiatric:         Behavior: Behavior is cooperative.       Assessment/Plan:     Diagnosis and Associated Orders:     1. Acute non-recurrent maxillary sinusitis  - amoxicillin-clavulanate (AUGMENTIN) 875-125 MG Tab; Take 1 Tablet by mouth 2 times a day for 7 days.  Dispense: 14 Tablet; Refill: 0    2. Acute cough  - benzonatate (TESSALON) 100 MG Cap; Take 1 Capsule by mouth 3 times a day as needed for Cough.  Dispense: 20 Capsule; Refill: 0  - promethazine-dextromethorphan (PROMETHAZINE-DM) 6.25-15 MG/5ML syrup; Take 5 mL by mouth every four hours as needed for Cough.  Dispense: 118 mL; Refill: 0    3. Viral URI with cough        Comments/MDM:    Due to the duration of symptoms greater than 2 week this patient is a candidate for antibiotic treatment.  -Increase water intake  -May use over the counter Ibuprofen/Tylenol as needed for any fever, body aches or throat pain  -May take long acting antihistamine for seasonal allergy symptoms and post-nasal drip as needed  -Over the counter cough suppressant as directed.  -May use over the counter saline nasal spray for nasal lavage for nasal congestion as needed  -May use over the counter Nasacort/Flonase for nasal congestion as needed   -May use throat lozenges for throat discomfort as needed   -May gargle with salt water up to 4x/day as needed for throat discomfort (1 tsp salt dissolved in 1 cup warm water)  -Monitor for increased sinus pain/pressure with sinus congestion with thick mucus production, sinus headache, cough, shortness of breath, fever- need re-evaluation        I personally reviewed prior external notes and test results pertinent to today's visit.  Red flags discussed as well as indications to present to the Emergency  Department.  Supportive care, natural history, differential diagnoses, and indications for immediate follow-up discussed.  Patient expresses understanding and agrees to plan.  Patient denies any other questions or concerns.    Follow-up with the primary care physician for recheck, reevaluation, and consideration of further management.      Please note that this dictation was created using voice recognition software. I have made a reasonable attempt to correct obvious errors, but I expect that there are errors of grammar and possibly content that I did not discover before finalizing the note.    This note was electronically signed by Selin Bonds PA-C

## 2023-02-24 ENCOUNTER — OFFICE VISIT (OUTPATIENT)
Dept: CARDIOLOGY | Facility: MEDICAL CENTER | Age: 48
End: 2023-02-24
Payer: COMMERCIAL

## 2023-02-24 VITALS
BODY MASS INDEX: 43.77 KG/M2 | DIASTOLIC BLOOD PRESSURE: 74 MMHG | HEART RATE: 91 BPM | SYSTOLIC BLOOD PRESSURE: 128 MMHG | RESPIRATION RATE: 14 BRPM | WEIGHT: 247 LBS | OXYGEN SATURATION: 97 % | HEIGHT: 63 IN

## 2023-02-24 DIAGNOSIS — R00.2 PALPITATIONS: ICD-10-CM

## 2023-02-24 DIAGNOSIS — E66.3 OVERWEIGHT: ICD-10-CM

## 2023-02-24 DIAGNOSIS — I47.11 INAPPROPRIATE SINUS TACHYCARDIA (HCC): ICD-10-CM

## 2023-02-24 PROCEDURE — 99214 OFFICE O/P EST MOD 30 MIN: CPT | Performed by: NURSE PRACTITIONER

## 2023-02-24 ASSESSMENT — ENCOUNTER SYMPTOMS
COUGH: 0
CLAUDICATION: 0
ORTHOPNEA: 0
ABDOMINAL PAIN: 0
PND: 0
MYALGIAS: 0
FEVER: 0
SHORTNESS OF BREATH: 0
PALPITATIONS: 0
NERVOUS/ANXIOUS: 0

## 2023-02-24 ASSESSMENT — FIBROSIS 4 INDEX: FIB4 SCORE: 0.6

## 2023-02-24 NOTE — PROGRESS NOTES
Subjective:   Blea Gonzalez is a 47 y.o. female who presents today for follow up on tachycardia.    Hx of palpitations with inappropriate sinus tachycardia, gestational diabetes, and obesity.    She presents today with her family.    She has been doing well on metoprolol and has no symptoms to report.    She does have some exertional dyspnea but due to lack of exercise.    She has no chest pain, shortness of breath, edema, dizziness/lightheadedness, or palpitations.    Past Medical History:   Diagnosis Date    Diabetes     gestational    Elevated WBC count     Heart burn     Inappropriate sinus node tachycardia     Indigestion     Migraine     Pain 01/07/15    Back=chronic(scoliosis)>4/10    Scoliosis      Past Surgical History:   Procedure Laterality Date    CHOLECYSTECTOMY  2016    BETH BY LAPAROSCOPY N/A 1/15/2015    Procedure: BETH BY LAPAROSCOPY;  Surgeon: Hima Joseph M.D.;  Location: SURGERY [facility];  Service:     RECOVERY  5/23/2012    Performed by SURGERY, IR-RECOVERY at SURGERY SAME DAY [facility] ORS    OTHER      wisdom teeth     Family History   Problem Relation Age of Onset    Diabetes Mother     Asthma Sister      Social History     Tobacco Use   Smoking Status Never   Smokeless Tobacco Never     Allergies   Allergen Reactions    Azithromycin      Z-pack- per pt causes severe diarrhea        Outpatient Encounter Medications as of 2/24/2023   Medication Sig Dispense Refill    metoprolol tartrate (LOPRESSOR) 25 MG Tab Take 1 Tablet by mouth 2 times a day. 200 Tablet 3    citalopram (CELEXA) 40 MG Tab       topiramate (TOPAMAX) 25 MG Tab 1 Tablet every day.      ibuprofen (MOTRIN) 600 MG Tab Take 1 tablet by mouth every 6 hours as needed for Moderate Pain. 30 tablet 0    loratadine (CLARITIN) 10 MG Tab Take 1 Tablet by mouth every day.      Esomeprazole Magnesium 20 MG Pack Take 20 mg by mouth.      Omega-3 Fatty Acids (FISH OIL PO) Take  by mouth.      CALCIUM PO Take  by mouth.       "MULTIPLE VITAMIN PO Take 1 Tab by mouth every day. Indications: Prenantal      [DISCONTINUED] metoprolol tartrate (LOPRESSOR) 25 MG Tab TAKE ONE TABLET BY MOUTH TWICE A DAY 60 Tablet 0    [DISCONTINUED] benzonatate (TESSALON) 100 MG Cap Take 1 Capsule by mouth 3 times a day as needed for Cough. (Patient not taking: Reported on 2/24/2023) 20 Capsule 0    [DISCONTINUED] promethazine-dextromethorphan (PROMETHAZINE-DM) 6.25-15 MG/5ML syrup Take 5 mL by mouth every four hours as needed for Cough. (Patient not taking: Reported on 2/24/2023) 118 mL 0    [DISCONTINUED] metFORMIN ER (GLUCOPHAGE XR) 500 MG TABLET SR 24 HR  (Patient not taking: Reported on 2/24/2023)      [DISCONTINUED] diphenhydrAMINE HCl (BENADRYL ALLERGY PO) Take  by mouth at bedtime as needed. (Patient not taking: Reported on 2/24/2023)      [DISCONTINUED] citalopram (CELEXA) 20 MG Tab Take 1 Tablet by mouth every day. (Patient not taking: Reported on 2/24/2023)       No facility-administered encounter medications on file as of 2/24/2023.     Review of Systems   Constitutional:  Negative for fever and malaise/fatigue.   Respiratory:  Negative for cough and shortness of breath.    Cardiovascular:  Negative for chest pain, palpitations, orthopnea, claudication, leg swelling and PND.   Gastrointestinal:  Negative for abdominal pain.   Musculoskeletal:  Negative for myalgias.   Neurological:  Weakness:   .   Psychiatric/Behavioral:  The patient is not nervous/anxious.    All other systems reviewed and are negative.     Objective:   /74 (BP Location: Left arm, Patient Position: Sitting, BP Cuff Size: Adult)   Pulse 91   Resp 14   Ht 1.6 m (5' 3\")   Wt 112 kg (247 lb)   SpO2 97%   BMI 43.75 kg/m²     Physical Exam  Vitals and nursing note reviewed.   Constitutional:       General: She is not in acute distress.     Appearance: Normal appearance. She is well-developed. She is obese.      Comments:      HENT:      Head: Normocephalic and atraumatic. "   Eyes:      Extraocular Movements: Extraocular movements intact.   Neck:      Vascular: No JVD.   Cardiovascular:      Rate and Rhythm: Normal rate and regular rhythm.      Pulses: Normal pulses.      Heart sounds: Normal heart sounds. No murmur heard.  Pulmonary:      Effort: Pulmonary effort is normal. No respiratory distress.      Breath sounds: Normal breath sounds.   Abdominal:      General: Bowel sounds are normal.      Palpations: Abdomen is soft.      Comments: 19 weeks pregnant   Musculoskeletal:         General: Normal range of motion.      Cervical back: Normal range of motion.   Skin:     General: Skin is warm and dry.      Capillary Refill: Capillary refill takes less than 2 seconds.   Neurological:      General: No focal deficit present.      Mental Status: She is alert and oriented to person, place, and time. Mental status is at baseline.   Psychiatric:         Mood and Affect: Mood normal.         Behavior: Behavior normal.         Thought Content: Thought content normal.         Judgment: Judgment normal.     Assessment:     1. Inappropriate sinus tachycardia        2. Palpitations  metoprolol tartrate (LOPRESSOR) 25 MG Tab      3. Overweight          Medical Decision Making:  Today's Assessment / Status / Plan:     1. Palpitations from inappropriate sinus tachycardia  -cont metoprolol tartrate 25 mg BID  -palpitations improved  -echo in '21 unremarkable  -EP consultation completed, cont bb therapy, no further recommendations    2. Overweight   -lifestyle modifications  -cont with dietary changes and start light walking program     Patient is to follow up with Bela WALLIS in 1 year with labs, medications refilled

## 2023-03-09 ENCOUNTER — OFFICE VISIT (OUTPATIENT)
Dept: URGENT CARE | Facility: PHYSICIAN GROUP | Age: 48
End: 2023-03-09
Payer: COMMERCIAL

## 2023-03-09 VITALS
HEIGHT: 63 IN | RESPIRATION RATE: 18 BRPM | BODY MASS INDEX: 43.38 KG/M2 | SYSTOLIC BLOOD PRESSURE: 135 MMHG | WEIGHT: 244.8 LBS | DIASTOLIC BLOOD PRESSURE: 58 MMHG | OXYGEN SATURATION: 97 % | TEMPERATURE: 98.3 F | HEART RATE: 86 BPM

## 2023-03-09 DIAGNOSIS — H66.92 ACUTE LEFT OTITIS MEDIA: ICD-10-CM

## 2023-03-09 DIAGNOSIS — J06.9 VIRAL URI WITH COUGH: ICD-10-CM

## 2023-03-09 PROCEDURE — 99213 OFFICE O/P EST LOW 20 MIN: CPT | Performed by: STUDENT IN AN ORGANIZED HEALTH CARE EDUCATION/TRAINING PROGRAM

## 2023-03-09 RX ORDER — AMOXICILLIN AND CLAVULANATE POTASSIUM 875; 125 MG/1; MG/1
1 TABLET, FILM COATED ORAL 2 TIMES DAILY
Qty: 14 TABLET | Refills: 0 | Status: SHIPPED | OUTPATIENT
Start: 2023-03-09 | End: 2023-03-16

## 2023-03-09 RX ORDER — DEXTROMETHORPHAN HYDROBROMIDE AND PROMETHAZINE HYDROCHLORIDE 15; 6.25 MG/5ML; MG/5ML
5 SYRUP ORAL EVERY 4 HOURS PRN
Qty: 120 ML | Refills: 0 | Status: SHIPPED | OUTPATIENT
Start: 2023-03-09

## 2023-03-09 ASSESSMENT — FIBROSIS 4 INDEX: FIB4 SCORE: 0.6

## 2023-03-09 NOTE — PROGRESS NOTES
Subjective:   Bela Gonzalez is a 47 y.o. female who presents for Cough (X7 day, chest pain, mucus) and Otalgia (Left ear pain, x2 days)      HPI:  Pleasant 47-year-old female presents the urgent care for 7 days of a mixed dry/productive cough.  She also reports some nasal congestion.  And postnasal drip.  She has new onset left ear pain for the past 3 days.  She does report some chest discomfort when she is coughing but no chest pain at rest.  Denies any chest pressure.  Further denies ear discharge, hearing loss, tinnitus, nausea, vomiting, abdominal pain, shortness of breath, hemoptysis, dizziness, or headache.  Using OTC cold medication.      Medications:    amoxicillin-clavulanate Tabs  CALCIUM PO  citalopram Tabs  Esomeprazole Magnesium Pack  FISH OIL PO  ibuprofen Tabs  loratadine Tabs  metoprolol tartrate Tabs  MULTIPLE VITAMIN PO  promethazine-dextromethorphan  topiramate Tabs    Allergies: Azithromycin    Problem List: Bela Gonzalez does not have any pertinent problems on file.    Surgical History:  Past Surgical History:   Procedure Laterality Date    CHOLECYSTECTOMY  2016    BETH BY LAPAROSCOPY N/A 1/15/2015    Procedure: BETH BY LAPAROSCOPY;  Surgeon: Hima Joseph M.D.;  Location: SURGERY [facility];  Service:     RECOVERY  5/23/2012    Performed by SURGERY, IR-RECOVERY at SURGERY SAME DAY [facility]    OTHER      wisdom teeth       Past Social Hx: Bela Gonzalez  reports that she has never smoked. She has never used smokeless tobacco. She reports that she does not drink alcohol and does not use drugs.     Past Family Hx:  Bela Gonzalez family history includes Asthma in her sister; Diabetes in her mother.     Problem list, medications, and allergies reviewed by myself today in Epic.     Objective:     /58 (BP Location: Right arm, Patient Position: Sitting, BP Cuff Size: Adult long)   Pulse 86   Temp 36.8 °C (98.3 °F) (Temporal)   Resp 18   Ht 1.6 m  "(5' 3\")   Wt 111 kg (244 lb 12.8 oz)   SpO2 97%   BMI 43.36 kg/m²     Physical Exam  Vitals reviewed.   Constitutional:       General: She is not in acute distress.     Appearance: Normal appearance.   HENT:      Head: Normocephalic.      Right Ear: Hearing, tympanic membrane, ear canal and external ear normal.      Left Ear: Hearing, ear canal and external ear normal. A middle ear effusion is present. No mastoid tenderness. Tympanic membrane is injected and erythematous. Tympanic membrane is not perforated or bulging.      Nose: Congestion present. No rhinorrhea.      Mouth/Throat:      Mouth: Mucous membranes are moist.      Pharynx: Uvula midline. No oropharyngeal exudate or posterior oropharyngeal erythema.      Tonsils: No tonsillar exudate.      Comments: Postnasal drip present to the posterior pharynx.  Eyes:      Conjunctiva/sclera: Conjunctivae normal.      Pupils: Pupils are equal, round, and reactive to light.   Cardiovascular:      Rate and Rhythm: Normal rate and regular rhythm.      Pulses: Normal pulses.      Heart sounds: Normal heart sounds. No murmur heard.  Pulmonary:      Effort: Pulmonary effort is normal. No respiratory distress.      Breath sounds: Normal breath sounds. No stridor. No wheezing, rhonchi or rales.   Musculoskeletal:      Cervical back: Normal range of motion.   Lymphadenopathy:      Cervical: No cervical adenopathy.   Skin:     General: Skin is warm and dry.   Neurological:      Mental Status: She is alert.       Assessment/Plan:     Diagnosis and associated orders:     1. Viral URI with cough  promethazine-dextromethorphan (PROMETHAZINE-DM) 6.25-15 MG/5ML syrup      2. Acute left otitis media  amoxicillin-clavulanate (AUGMENTIN) 875-125 MG Tab         Comments/MDM:     Patient's presentation physical exam findings are consistent with viral URI with cough.  Her cough does appear to be postviral.  Pulmonary exam shows no abnormal findings.  No signs of pneumonia.  No wheezing, " rales, rhonchi.  Vitals all within normal limits.  SPO2 maintained greater than 97%.  No respiratory distress.  She does appear to have a secondary otitis media to the left ear.  We will treat this with Augmentin.  She was educated on use this medication possible side effects including allergic reaction.  No known allergies medication.  I do not suspect bacterial sinus infection at this time.  Advised Flonase to help alleviate her congestion.  Patient is well-appearing and nontoxic.  Patient is agreeable to the plan.    ED/return precautions were given.         Differential diagnosis, natural history, supportive care, and indications for immediate follow-up discussed.    Advised the patient to follow-up with the primary care physician for recheck, reevaluation, and consideration of further management.    Please note that this dictation was created using voice recognition software. I have made a reasonable attempt to correct obvious errors, but I expect that there are errors of grammar and possibly content that I did not discover before finalizing the note.    Electronically signed by Michael Waller PA-C.

## 2023-03-09 NOTE — LETTER
[facility]  [address]  [place] [address]     March 9, 2023    Patient: Bela Gonzalez   YOB: 1975   Date of Visit: 3/9/2023       To Whom It May Concern:    Bela Gonzalez was seen and treated in our department on 3/9/2023.  Patient is excuse from work on 3/9/2023.    Sincerely,     Michael Waller P.A.-C.

## 2023-04-17 ENCOUNTER — OFFICE VISIT (OUTPATIENT)
Dept: URGENT CARE | Facility: PHYSICIAN GROUP | Age: 48
End: 2023-04-17
Payer: COMMERCIAL

## 2023-04-17 ENCOUNTER — HOSPITAL ENCOUNTER (OUTPATIENT)
Facility: MEDICAL CENTER | Age: 48
End: 2023-04-17
Attending: NURSE PRACTITIONER
Payer: COMMERCIAL

## 2023-04-17 VITALS
RESPIRATION RATE: 16 BRPM | BODY MASS INDEX: 43.23 KG/M2 | SYSTOLIC BLOOD PRESSURE: 110 MMHG | TEMPERATURE: 98.8 F | WEIGHT: 244 LBS | HEART RATE: 91 BPM | HEIGHT: 63 IN | OXYGEN SATURATION: 98 % | DIASTOLIC BLOOD PRESSURE: 70 MMHG

## 2023-04-17 DIAGNOSIS — N30.00 ACUTE CYSTITIS WITHOUT HEMATURIA: Primary | ICD-10-CM

## 2023-04-17 DIAGNOSIS — R30.0 DYSURIA: ICD-10-CM

## 2023-04-17 DIAGNOSIS — R39.15 URINARY URGENCY: ICD-10-CM

## 2023-04-17 DIAGNOSIS — R35.0 URINARY FREQUENCY: ICD-10-CM

## 2023-04-17 LAB
APPEARANCE UR: NORMAL
BILIRUB UR STRIP-MCNC: NEGATIVE MG/DL
COLOR UR AUTO: NORMAL
GLUCOSE UR STRIP.AUTO-MCNC: NEGATIVE MG/DL
KETONES UR STRIP.AUTO-MCNC: NEGATIVE MG/DL
LEUKOCYTE ESTERASE UR QL STRIP.AUTO: NORMAL
NITRITE UR QL STRIP.AUTO: POSITIVE
PH UR STRIP.AUTO: 7 [PH] (ref 5–8)
PROT UR QL STRIP: NORMAL MG/DL
RBC UR QL AUTO: NORMAL
SP GR UR STRIP.AUTO: 1.02
UROBILINOGEN UR STRIP-MCNC: 0.2 MG/DL

## 2023-04-17 PROCEDURE — 87086 URINE CULTURE/COLONY COUNT: CPT

## 2023-04-17 PROCEDURE — 81002 URINALYSIS NONAUTO W/O SCOPE: CPT | Performed by: NURSE PRACTITIONER

## 2023-04-17 PROCEDURE — 87186 SC STD MICRODIL/AGAR DIL: CPT

## 2023-04-17 PROCEDURE — 87077 CULTURE AEROBIC IDENTIFY: CPT | Mod: 91

## 2023-04-17 PROCEDURE — 99213 OFFICE O/P EST LOW 20 MIN: CPT | Performed by: NURSE PRACTITIONER

## 2023-04-17 RX ORDER — NITROFURANTOIN 25; 75 MG/1; MG/1
100 CAPSULE ORAL EVERY 12 HOURS
Qty: 10 CAPSULE | Refills: 0 | Status: SHIPPED | OUTPATIENT
Start: 2023-04-17 | End: 2023-04-22

## 2023-04-17 ASSESSMENT — ENCOUNTER SYMPTOMS
FEVER: 0
PSYCHIATRIC NEGATIVE: 1
FLANK PAIN: 0
RESPIRATORY NEGATIVE: 1
VOMITING: 0
FATIGUE: 0
CARDIOVASCULAR NEGATIVE: 1
ABDOMINAL PAIN: 0

## 2023-04-17 ASSESSMENT — FIBROSIS 4 INDEX: FIB4 SCORE: 0.6

## 2023-04-17 NOTE — PATIENT INSTRUCTIONS
Urinary Tract Infection, Adult  A urinary tract infection (UTI) is an infection of any part of the urinary tract. The urinary tract includes:  The kidneys.  The ureters.  The bladder.  The urethra.  These organs make, store, and get rid of pee (urine) in the body.  What are the causes?  This is caused by germs (bacteria) in your genital area. These germs grow and cause swelling (inflammation) of your urinary tract.  What increases the risk?  You are more likely to develop this condition if:  You have a small, thin tube (catheter) to drain pee.  You cannot control when you pee or poop (incontinence).  You are female, and:  You use these methods to prevent pregnancy:  A medicine that kills sperm (spermicide).  A device that blocks sperm (diaphragm).  You have low levels of a female hormone (estrogen).  You are pregnant.  You have genes that add to your risk.  You are sexually active.  You take antibiotic medicines.  You have trouble peeing because of:  A prostate that is bigger than normal, if you are male.  A blockage in the part of your body that drains pee from the bladder (urethra).  A kidney stone.  A nerve condition that affects your bladder (neurogenic bladder).  Not getting enough to drink.  Not peeing often enough.  You have other conditions, such as:  Diabetes.  A weak disease-fighting system (immune system).  Sickle cell disease.  Gout.  Injury of the spine.  What are the signs or symptoms?  Symptoms of this condition include:  Needing to pee right away (urgently).  Peeing often.  Peeing small amounts often.  Pain or burning when peeing.  Blood in the pee.  Pee that smells bad or not like normal.  Trouble peeing.  Pee that is cloudy.  Fluid coming from the vagina, if you are female.  Pain in the belly or lower back.  Other symptoms include:  Throwing up (vomiting).  No urge to eat.  Feeling mixed up (confused).  Being tired and grouchy (irritable).  A fever.  Watery poop (diarrhea).  How is this  treated?  This condition may be treated with:  Antibiotic medicine.  Other medicines.  Drinking enough water.  Follow these instructions at home:    Medicines  Take over-the-counter and prescription medicines only as told by your doctor.  If you were prescribed an antibiotic medicine, take it as told by your doctor. Do not stop taking it even if you start to feel better.  General instructions  Make sure you:  Pee until your bladder is empty.  Do not hold pee for a long time.  Empty your bladder after sex.  Wipe from front to back after pooping if you are a female. Use each tissue one time when you wipe.  Drink enough fluid to keep your pee pale yellow.  Keep all follow-up visits as told by your doctor. This is important.  Contact a doctor if:  You do not get better after 1-2 days.  Your symptoms go away and then come back.  Get help right away if:  You have very bad back pain.  You have very bad pain in your lower belly.  You have a fever.  You are sick to your stomach (nauseous).  You are throwing up.  Summary  A urinary tract infection (UTI) is an infection of any part of the urinary tract.  This condition is caused by germs in your genital area.  There are many risk factors for a UTI. These include having a small, thin tube to drain pee and not being able to control when you pee or poop.  Treatment includes antibiotic medicines for germs.  Drink enough fluid to keep your pee pale yellow.  This information is not intended to replace advice given to you by your health care provider. Make sure you discuss any questions you have with your health care provider.  Document Released: 06/05/2009 Document Revised: 12/05/2019 Document Reviewed: 06/27/2019  [employer] Patient Education © 2020 [employer]

## 2023-04-17 NOTE — PROGRESS NOTES
Subjective:     Bela Gonzalez is a 47 y.o. female who presents for UTI (Freq urination, painful. X1.5 weeks )      UTI  This is a new problem. The current episode started in the past 7 days. The problem occurs constantly. The problem has been gradually worsening. Associated symptoms include urinary symptoms. Pertinent negatives include no abdominal pain, fatigue, fever or vomiting. Nothing aggravates the symptoms. She has tried nothing for the symptoms.       Review of Systems   Constitutional:  Negative for fatigue and fever.   HENT: Negative.     Respiratory: Negative.     Cardiovascular: Negative.    Gastrointestinal:  Negative for abdominal pain and vomiting.   Genitourinary:  Positive for dysuria, frequency and urgency. Negative for flank pain and hematuria.   Skin: Negative.    Psychiatric/Behavioral: Negative.       PMH:   Past Medical History:   Diagnosis Date    Diabetes     gestational    Elevated WBC count     Heart burn     Inappropriate sinus node tachycardia     Indigestion     Migraine     Pain 01/07/15    Back=chronic(scoliosis)>4/10    Scoliosis      ALLERGIES:   Allergies   Allergen Reactions    Azithromycin      Z-pack- per pt causes severe diarrhea        SURGHX:   Past Surgical History:   Procedure Laterality Date    CHOLECYSTECTOMY  2016    BETH BY LAPAROSCOPY N/A 1/15/2015    Procedure: BETH BY LAPAROSCOPY;  Surgeon: Hima Joseph M.D.;  Location: SURGERY [facility];  Service:     RECOVERY  5/23/2012    Performed by SURGERY, IR-RECOVERY at SURGERY SAME DAY [facility] ORS    OTHER      wisdom teeth     SOCHX:   Social History     Socioeconomic History    Marital status:    Tobacco Use    Smoking status: Never    Smokeless tobacco: Never   Vaping Use    Vaping Use: Never used   Substance and Sexual Activity    Alcohol use: No    Drug use: No     Comment: quit Meth 9 years ago     FH:   Family History   Problem Relation Age of Onset    Diabetes Mother     Asthma Sister      "     Objective:   /70 (BP Location: Left arm, Patient Position: Sitting, BP Cuff Size: Adult)   Pulse 91   Temp 37.1 °C (98.8 °F) (Temporal)   Resp 16   Ht 1.6 m (5' 3\")   Wt 111 kg (244 lb)   SpO2 98%   BMI 43.22 kg/m²     Physical Exam  Vitals and nursing note reviewed.   Constitutional:       General: She is not in acute distress.     Appearance: Normal appearance. She is not ill-appearing.   HENT:      Head: Normocephalic and atraumatic.      Right Ear: External ear normal.      Left Ear: External ear normal.      Nose: No congestion or rhinorrhea.      Mouth/Throat:      Mouth: Mucous membranes are moist.   Eyes:      Extraocular Movements: Extraocular movements intact.      Pupils: Pupils are equal, round, and reactive to light.   Cardiovascular:      Rate and Rhythm: Normal rate and regular rhythm.      Pulses: Normal pulses.      Heart sounds: Normal heart sounds.   Pulmonary:      Effort: Pulmonary effort is normal.      Breath sounds: Normal breath sounds.   Abdominal:      General: Abdomen is flat. Bowel sounds are normal.      Palpations: Abdomen is soft.      Tenderness: There is no abdominal tenderness. There is no right CVA tenderness or left CVA tenderness.   Musculoskeletal:         General: Normal range of motion.      Cervical back: Normal range of motion and neck supple.   Skin:     General: Skin is warm and dry.      Capillary Refill: Capillary refill takes less than 2 seconds.   Neurological:      General: No focal deficit present.      Mental Status: She is alert and oriented to person, place, and time. Mental status is at baseline.   Psychiatric:         Mood and Affect: Mood normal.         Behavior: Behavior normal.         Thought Content: Thought content normal.         Judgment: Judgment normal.     PCO2 urinalysis  Large blood  Nitrite positive  Moderate leukocyte  pH 7.0  Glucose negative  Ketones negative      Assessment/Plan:   Assessment      1. Acute cystitis without " hematuria  nitrofurantoin (MACROBID) 100 MG Cap      2. Dysuria  POCT Urinalysis    Urine Culture      3. Urinary frequency  POCT Urinalysis    Urine Culture      4. Urinary urgency  POCT Urinalysis    Urine Culture        Prescription was sent in to preferred pharmacy. AVS was given and reviewed with patient. Patient educated on red flags of UTI and encouraged to seek care back in UC or ER for  fever, chills, flank pain, or worsening symptoms.

## 2023-04-18 DIAGNOSIS — B95.1 GROUP B STREPTOCOCCAL UTI: ICD-10-CM

## 2023-04-18 DIAGNOSIS — N39.0 GROUP B STREPTOCOCCAL UTI: ICD-10-CM

## 2023-04-18 RX ORDER — CEPHALEXIN 500 MG/1
500 CAPSULE ORAL 2 TIMES DAILY
Qty: 20 CAPSULE | Refills: 0 | Status: SHIPPED | OUTPATIENT
Start: 2023-04-18 | End: 2023-04-28

## 2023-04-19 LAB
BACTERIA UR CULT: ABNORMAL
SIGNIFICANT IND 70042: ABNORMAL
SITE SITE: ABNORMAL
SOURCE SOURCE: ABNORMAL

## 2023-04-27 ENCOUNTER — OFFICE VISIT (OUTPATIENT)
Dept: URGENT CARE | Facility: PHYSICIAN GROUP | Age: 48
End: 2023-04-27
Payer: COMMERCIAL

## 2023-04-27 VITALS
HEART RATE: 100 BPM | OXYGEN SATURATION: 94 % | BODY MASS INDEX: 43.41 KG/M2 | DIASTOLIC BLOOD PRESSURE: 66 MMHG | WEIGHT: 245 LBS | TEMPERATURE: 98.9 F | RESPIRATION RATE: 14 BRPM | SYSTOLIC BLOOD PRESSURE: 96 MMHG | HEIGHT: 63 IN

## 2023-04-27 DIAGNOSIS — T36.95XA ANTIBIOTIC-ASSOCIATED DIARRHEA: ICD-10-CM

## 2023-04-27 DIAGNOSIS — R10.9 ABDOMINAL PAIN, UNSPECIFIED ABDOMINAL LOCATION: ICD-10-CM

## 2023-04-27 DIAGNOSIS — K52.1 ANTIBIOTIC-ASSOCIATED DIARRHEA: ICD-10-CM

## 2023-04-27 PROCEDURE — 99213 OFFICE O/P EST LOW 20 MIN: CPT | Performed by: PHYSICIAN ASSISTANT

## 2023-04-27 ASSESSMENT — FIBROSIS 4 INDEX: FIB4 SCORE: 0.6

## 2023-04-28 ENCOUNTER — HOSPITAL ENCOUNTER (OUTPATIENT)
Facility: MEDICAL CENTER | Age: 48
End: 2023-04-28
Attending: PHYSICIAN ASSISTANT
Payer: COMMERCIAL

## 2023-04-28 ENCOUNTER — APPOINTMENT (OUTPATIENT)
Dept: RADIOLOGY | Facility: MEDICAL CENTER | Age: 48
End: 2023-04-28
Attending: FAMILY MEDICINE
Payer: COMMERCIAL

## 2023-04-28 DIAGNOSIS — Z12.31 VISIT FOR SCREENING MAMMOGRAM: ICD-10-CM

## 2023-04-28 PROCEDURE — 87493 C DIFF AMPLIFIED PROBE: CPT

## 2023-04-28 PROCEDURE — 87899 AGENT NOS ASSAY W/OPTIC: CPT | Mod: 91

## 2023-04-28 PROCEDURE — 87324 CLOSTRIDIUM AG IA: CPT

## 2023-04-28 PROCEDURE — 87045 FECES CULTURE AEROBIC BACT: CPT

## 2023-04-28 NOTE — PROGRESS NOTES
"Subjective:   Bela Gonzalez is a 47 y.o. female who presents for GI Problem (Patient states was given antibiotic Keflex and it has been upsetting patient's stomach, painful when taking medication in stomach)      HPI  The patient presents to the Urgent Care with complaints of upset stomach, abdominal pain, diarrhea for the past 5 to 7 days.  Recently placed on nitrofurantoin and Keflex due to UTI.  She was placed on Keflex for 10 days.  She has a couple days left of Keflex.  Her UTI symptoms resolved so she stopped taking the Keflex.  Abdominal pain mostly to the upper abdomen described as cramping and feeling bloated.  Gradually worsening.  Pain 6/10 in severity. Diarrhea seven times today.  No known exacerbating factors such as eating food.  She feels drained.  Some nausea but no vomiting.  No fevers. History of cholecystectomy.  Tolerating fluids.  Eating solid foods.  The diarrhea does not wake her up at night but the abdominal pain does.  No bloody or black stools.          Past Medical History:   Diagnosis Date    Diabetes     gestational    Elevated WBC count     Heart burn     Inappropriate sinus node tachycardia     Indigestion     Migraine     Pain 01/07/15    Back=chronic(scoliosis)>4/10    Scoliosis      Allergies   Allergen Reactions    Azithromycin      Z-pack- per pt causes severe diarrhea           Objective:     BP 96/66 (BP Location: Left arm, Patient Position: Sitting, BP Cuff Size: Large adult)   Pulse 100   Temp 37.2 °C (98.9 °F)   Resp 14   Ht 1.6 m (5' 3\")   Wt 111 kg (245 lb)   SpO2 94%   BMI 43.40 kg/m²     Physical Exam  Vitals reviewed.   Constitutional:       General: She is not in acute distress.     Appearance: Normal appearance. She is not ill-appearing or toxic-appearing.   HENT:      Mouth/Throat:      Mouth: Mucous membranes are moist.   Eyes:      Conjunctiva/sclera: Conjunctivae normal.      Pupils: Pupils are equal, round, and reactive to light.   Cardiovascular: "      Rate and Rhythm: Normal rate.   Pulmonary:      Effort: Pulmonary effort is normal.   Abdominal:      General: Abdomen is flat. Bowel sounds are normal. There is no distension.      Palpations: There is no hepatomegaly, splenomegaly or mass.      Tenderness: There is abdominal tenderness (mild upper abdominal tenderness). There is no guarding or rebound.   Musculoskeletal:      Cervical back: Neck supple.   Skin:     General: Skin is warm and dry.   Neurological:      General: No focal deficit present.      Mental Status: She is alert and oriented to person, place, and time.   Psychiatric:         Mood and Affect: Mood normal.         Behavior: Behavior normal.       Diagnosis and associated orders:     1. Antibiotic-associated diarrhea  - CULTURE STOOL; Future  - C Diff by PCR rflx Toxin; Future    2. Abdominal pain, unspecified abdominal location       Comments/MDM:     Most likely due to antibiotics.  She is well-appearing in no acute distress.  Normal vital signs.  Tolerating fluids.   Discontinue antibiotic. UTI symptoms resolved.   Stool culture and C. difficile pending  Increase fluid intake, Pedialyte, bland diet and increase as tolerated.  Patient understands to present immediately to the ER for any worsening abdominal pain, unable to tolerate fluids, or any other concerns.       I personally reviewed prior external notes and test results pertinent to today's visit. Pathogenesis of diagnosis discussed including typical length and natural progression. Supportive care, natural history, differential diagnoses, and indications for immediate follow-up discussed. Patient expresses understanding and agrees to plan. Patient denies any other questions or concerns.     Follow-up with the primary care physician for recheck, reevaluation, and consideration of further management.    Please note that this dictation was created using voice recognition software. I have made a reasonable attempt to correct obvious  errors, but I expect that there are errors of grammar and possibly content that I did not discover before finalizing the note.    This note was electronically signed by Benji Ewing PA-C

## 2023-04-29 LAB
C DIFF DNA SPEC QL NAA+PROBE: NORMAL
C DIFF TOX A+B STL QL IA: POSITIVE
C DIFF TOX GENS STL QL NAA+PROBE: NORMAL
E COLI SXT1+2 STL IA: NORMAL
SIGNIFICANT IND 70042: NORMAL
SITE SITE: NORMAL
SOURCE SOURCE: NORMAL

## 2023-04-30 LAB
BACTERIA STL CULT: NORMAL
C JEJUNI+C COLI AG STL QL: NORMAL
E COLI SXT1+2 STL IA: NORMAL
SIGNIFICANT IND 70042: NORMAL
SITE SITE: NORMAL
SOURCE SOURCE: NORMAL

## 2023-05-01 DIAGNOSIS — A04.72 C. DIFFICILE DIARRHEA: ICD-10-CM

## 2023-06-30 ENCOUNTER — APPOINTMENT (RX ONLY)
Dept: URBAN - METROPOLITAN AREA CLINIC 4 | Facility: CLINIC | Age: 48
Setting detail: DERMATOLOGY
End: 2023-06-30

## 2023-06-30 DIAGNOSIS — D22 MELANOCYTIC NEVI: ICD-10-CM

## 2023-06-30 DIAGNOSIS — Z71.89 OTHER SPECIFIED COUNSELING: ICD-10-CM

## 2023-06-30 DIAGNOSIS — D18.0 HEMANGIOMA: ICD-10-CM

## 2023-06-30 DIAGNOSIS — L81.4 OTHER MELANIN HYPERPIGMENTATION: ICD-10-CM

## 2023-06-30 DIAGNOSIS — L82.1 OTHER SEBORRHEIC KERATOSIS: ICD-10-CM

## 2023-06-30 PROBLEM — D18.01 HEMANGIOMA OF SKIN AND SUBCUTANEOUS TISSUE: Status: ACTIVE | Noted: 2023-06-30

## 2023-06-30 PROBLEM — D22.5 MELANOCYTIC NEVI OF TRUNK: Status: ACTIVE | Noted: 2023-06-30

## 2023-06-30 PROBLEM — D23.62 OTHER BENIGN NEOPLASM OF SKIN OF LEFT UPPER LIMB, INCLUDING SHOULDER: Status: ACTIVE | Noted: 2023-06-30

## 2023-06-30 PROCEDURE — ? SUNSCREEN RECOMMENDATIONS

## 2023-06-30 PROCEDURE — ? OBSERVATION AND MEASURE

## 2023-06-30 PROCEDURE — 99203 OFFICE O/P NEW LOW 30 MIN: CPT

## 2023-06-30 PROCEDURE — ? COUNSELING

## 2023-06-30 ASSESSMENT — LOCATION SIMPLE DESCRIPTION DERM
LOCATION SIMPLE: LOWER BACK
LOCATION SIMPLE: RIGHT SHOULDER
LOCATION SIMPLE: UPPER BACK
LOCATION SIMPLE: LEFT SHOULDER
LOCATION SIMPLE: ABDOMEN

## 2023-06-30 ASSESSMENT — LOCATION ZONE DERM
LOCATION ZONE: TRUNK
LOCATION ZONE: ARM

## 2023-06-30 ASSESSMENT — LOCATION DETAILED DESCRIPTION DERM
LOCATION DETAILED: RIGHT POSTERIOR SHOULDER
LOCATION DETAILED: LEFT POSTERIOR SHOULDER
LOCATION DETAILED: INFERIOR THORACIC SPINE
LOCATION DETAILED: EPIGASTRIC SKIN
LOCATION DETAILED: SUPERIOR LUMBAR SPINE

## 2023-06-30 NOTE — PROCEDURE: MIPS QUALITY
Coronavirus (COVID-19) Notification    Caller Name (Patient, parent, daughter/son, grandparent, etc)  John Jeter, patient      Gather patient reported symptoms   Assessment   Current Symptoms at time of phone call, reported by patient: (if no symptoms, document: No symptoms] Fatigue, cough, lung tightness, feverish/chills, nasal congestion   Date of symptom(s) onset (if applicable) 1/7/2023     If at time of call, Patients symptoms have worsened, the Patient should contact 911 or have someone drive them to Emergency Dept promptly:      If Patient calling 911, inform 911 personal that you have tested positive for the Coronavirus (COVID-19).  Place mask on and await 911 to arrive.    If Emergency Dept, If possible, please have another adult drive you to the Emergency Dept but you need to wear mask when in contact with other people.      Treatment Options:   Patient classified as COVID treatment eligible by Epic high risk algorithm: Yes  Is the patient symptomatic at the time of result notification? Yes. Was the onset of symptoms within the last 5 days? Yes.   There are now oral medications available for the treatment of COVID-19.  Taking one of these medications within the first five days of symptoms (when people may not yet feel severely ill) has been shown to make people feel better, prevent them from getting sicker, and preventing hospitalization and death.   Does the patient agree to have a visit with a provider to discuss treatment options? Yes. Is the patient seen at [facility]?  No: Warm transfer caller to [phone] to be scheduled with a virtual urgent provider.  During transfer, instruct  on appropriate time frame for visit     Review information with Patient    Your result was positive. This means you have COVID-19 (coronavirus).    How can I protect others?    These guidelines are for isolating before returning to work, school or .    If you DO have symptoms    Stay home 
and away from others     For at least 5 days after your symptoms started, AND    You are fever free for 24 hours (with no medicine that reduces fever), AND    Your other symptoms are better    Wear a mask for 10 full days anytime you are around others    If you DON'T have symptoms    Stay home and away from others for at least 5 days after your positive test    Wear a mask for 10 full days anytime you are around others      Would you like me to review some of that information with you now?  Yes    How can I take care of myself?      Get lots of rest. Drink extra fluids (unless a doctor has told you not to).      Take Tylenol (acetaminophen) for fever or pain. If you have liver or kidney problems, ask your family doctor if it's okay to take Tylenol.     Take either:     650 mg (two 325 mg pills) every 4 to 6 hours, or     1,000 mg (two 500 mg pills) every 8 hours as needed.     Note: Do not take more than 3,000 mg in one day. Acetaminophen is found in many medicines (both prescribed and over-the-counter medicines). Read all labels to be sure you don't take too much.        Know when to call 911: Emergency warning signs include:    Trouble breathing or shortness of breath    Pain or pressure in the chest that doesn't go away    Feeling confused like you haven't felt before, or not being able to wake up    Bluish-colored lips or face          Beatriz Moore RN            
Quality 130: Documentation Of Current Medications In The Medical Record: Current Medications Documented
Quality 226: Preventive Care And Screening: Tobacco Use: Screening And Cessation Intervention: Patient screened for tobacco use and is an ex/non-smoker
Quality 110: Preventive Care And Screening: Influenza Immunization: Influenza Immunization Administered during Influenza season
Detail Level: Detailed

## 2023-06-30 NOTE — HPI: FULL BODY SKIN EXAMINATION
How Severe Are Your Spot(S)?: mild
What Type Of Note Output Would You Prefer (Optional)?: Standard Output
What Is The Reason For Today's Visit?: Full Body Skin Examination
What Is The Reason For Today's Visit? (Being Monitored For X): concerning skin lesions on an annual basis
Additional History: Pt has not noticed any new or changing moles. No tender or bleeding spots.

## 2023-07-03 ENCOUNTER — HOSPITAL ENCOUNTER (OUTPATIENT)
Dept: RADIOLOGY | Facility: MEDICAL CENTER | Age: 48
End: 2023-07-03
Attending: FAMILY MEDICINE
Payer: COMMERCIAL

## 2023-07-03 DIAGNOSIS — N64.4 PAINFUL BREASTS: ICD-10-CM

## 2023-07-03 PROCEDURE — G0279 TOMOSYNTHESIS, MAMMO: HCPCS

## 2024-02-01 ENCOUNTER — OFFICE VISIT (OUTPATIENT)
Dept: URGENT CARE | Facility: PHYSICIAN GROUP | Age: 49
End: 2024-02-01
Payer: COMMERCIAL

## 2024-02-01 VITALS
SYSTOLIC BLOOD PRESSURE: 108 MMHG | DIASTOLIC BLOOD PRESSURE: 70 MMHG | BODY MASS INDEX: 42.7 KG/M2 | WEIGHT: 241 LBS | TEMPERATURE: 97.9 F | HEIGHT: 63 IN | HEART RATE: 85 BPM | RESPIRATION RATE: 16 BRPM | OXYGEN SATURATION: 98 %

## 2024-02-01 DIAGNOSIS — N30.00 ACUTE CYSTITIS WITHOUT HEMATURIA: ICD-10-CM

## 2024-02-01 LAB
APPEARANCE UR: CLEAR
BILIRUB UR STRIP-MCNC: NORMAL MG/DL
COLOR UR AUTO: YELLOW
GLUCOSE UR STRIP.AUTO-MCNC: NORMAL MG/DL
KETONES UR STRIP.AUTO-MCNC: NORMAL MG/DL
LEUKOCYTE ESTERASE UR QL STRIP.AUTO: NORMAL
NITRITE UR QL STRIP.AUTO: NORMAL
PH UR STRIP.AUTO: 5.5 [PH] (ref 5–8)
POCT INT CON NEG: NEGATIVE
POCT INT CON POS: POSITIVE
POCT URINE PREGNANCY TEST: NEGATIVE
PROT UR QL STRIP: NORMAL MG/DL
RBC UR QL AUTO: NORMAL
SP GR UR STRIP.AUTO: 1.03
UROBILINOGEN UR STRIP-MCNC: 0.2 MG/DL

## 2024-02-01 PROCEDURE — 99213 OFFICE O/P EST LOW 20 MIN: CPT | Performed by: STUDENT IN AN ORGANIZED HEALTH CARE EDUCATION/TRAINING PROGRAM

## 2024-02-01 PROCEDURE — 81025 URINE PREGNANCY TEST: CPT | Performed by: STUDENT IN AN ORGANIZED HEALTH CARE EDUCATION/TRAINING PROGRAM

## 2024-02-01 PROCEDURE — 81002 URINALYSIS NONAUTO W/O SCOPE: CPT | Performed by: STUDENT IN AN ORGANIZED HEALTH CARE EDUCATION/TRAINING PROGRAM

## 2024-02-01 PROCEDURE — 3078F DIAST BP <80 MM HG: CPT | Performed by: STUDENT IN AN ORGANIZED HEALTH CARE EDUCATION/TRAINING PROGRAM

## 2024-02-01 PROCEDURE — 3074F SYST BP LT 130 MM HG: CPT | Performed by: STUDENT IN AN ORGANIZED HEALTH CARE EDUCATION/TRAINING PROGRAM

## 2024-02-01 PROCEDURE — 1126F AMNT PAIN NOTED NONE PRSNT: CPT | Performed by: STUDENT IN AN ORGANIZED HEALTH CARE EDUCATION/TRAINING PROGRAM

## 2024-02-01 RX ORDER — CEFDINIR 300 MG/1
300 CAPSULE ORAL EVERY 12 HOURS
Qty: 10 CAPSULE | Refills: 0 | Status: SHIPPED | OUTPATIENT
Start: 2024-02-01 | End: 2024-02-06

## 2024-02-01 ASSESSMENT — ENCOUNTER SYMPTOMS
FEVER: 0
CONSTIPATION: 0
ABDOMINAL PAIN: 0
DIARRHEA: 0
FLANK PAIN: 0
NAUSEA: 0
VOMITING: 0
CHILLS: 0

## 2024-02-01 ASSESSMENT — PAIN SCALES - GENERAL: PAINLEVEL: NO PAIN

## 2024-02-01 ASSESSMENT — FIBROSIS 4 INDEX: FIB4 SCORE: 0.62

## 2024-02-02 NOTE — PROGRESS NOTES
"Subjective     Bela Gonzalez is a 48 y.o. female who presents with UTI (X 1 week pelvic pain urgency )            Bela is a 48 y.o. female who presents to urgent care with urinary frequency, urinary urgency, urinary hesitancy and lower abdominal pressure.  Patient reports history of UTIs with similar symptoms in the past.  No fever/chills.  No abdominal pain or flank pain.        Review of Systems   Constitutional:  Negative for chills and fever.   Gastrointestinal:  Negative for abdominal pain, constipation, diarrhea, nausea and vomiting.   Genitourinary:  Positive for frequency and urgency. Negative for dysuria, flank pain and hematuria.   All other systems reviewed and are negative.             Objective     /70 (BP Location: Right arm, Patient Position: Sitting, BP Cuff Size: Large adult)   Pulse 85   Temp 36.6 °C (97.9 °F) (Temporal)   Resp 16   Ht 1.6 m (5' 3\")   Wt 109 kg (241 lb)   SpO2 98%   BMI 42.69 kg/m²      Physical Exam  Vitals reviewed.   Constitutional:       Appearance: Normal appearance.   HENT:      Head: Normocephalic and atraumatic.      Nose: Nose normal.   Eyes:      Extraocular Movements: Extraocular movements intact.      Conjunctiva/sclera: Conjunctivae normal.      Pupils: Pupils are equal, round, and reactive to light.   Cardiovascular:      Rate and Rhythm: Normal rate.   Pulmonary:      Effort: Pulmonary effort is normal.   Abdominal:      General: Abdomen is flat. Bowel sounds are normal. There is no distension.      Palpations: Abdomen is soft.      Tenderness: There is no abdominal tenderness. There is no left CVA tenderness, guarding or rebound.   Skin:     General: Skin is warm and dry.   Neurological:      General: No focal deficit present.      Mental Status: She is alert. Mental status is at baseline.                             Assessment & Plan        1. Acute cystitis without hematuria  - POCT Urinalysis  - POCT Pregnancy  - URINE CULTURE(NEW); " Future  - cefdinir (OMNICEF) 300 MG Cap; Take 1 Capsule by mouth every 12 hours for 5 days.  Dispense: 10 Capsule; Refill: 0     Differential diagnoses, supportive care measures and indications for immediate follow-up discussed with patient. Pathogenesis of diagnosis discussed including typical length and natural progression.    Instructed to return to urgent care or nearest emergency department if symptoms fail to improve, for any change in condition, further concerns, or new concerning symptoms.    Patient states understanding and agrees with the plan of care and discharge instructions.

## 2024-02-26 DIAGNOSIS — R00.2 PALPITATIONS: ICD-10-CM

## 2024-02-26 NOTE — TELEPHONE ENCOUNTER
Is the patient due for a refill? Yes    Was the patient seen the past year? No    Date of last office visit: 02/24/2023    Does the patient have an upcoming appointment?  No   If yes, When?     Provider to refill:SC    Does the patients insurance require a 100 day supply?  No

## 2024-05-26 DIAGNOSIS — R00.2 PALPITATIONS: ICD-10-CM

## 2024-06-04 ENCOUNTER — OFFICE VISIT (OUTPATIENT)
Dept: CARDIOLOGY | Facility: MEDICAL CENTER | Age: 49
End: 2024-06-04
Attending: NURSE PRACTITIONER
Payer: COMMERCIAL

## 2024-06-04 VITALS
HEIGHT: 63 IN | WEIGHT: 244.3 LBS | OXYGEN SATURATION: 96 % | DIASTOLIC BLOOD PRESSURE: 62 MMHG | SYSTOLIC BLOOD PRESSURE: 120 MMHG | BODY MASS INDEX: 43.29 KG/M2 | RESPIRATION RATE: 16 BRPM | HEART RATE: 92 BPM

## 2024-06-04 DIAGNOSIS — I47.11 INAPPROPRIATE SINUS TACHYCARDIA (HCC): ICD-10-CM

## 2024-06-04 DIAGNOSIS — E66.3 OVERWEIGHT: ICD-10-CM

## 2024-06-04 DIAGNOSIS — R73.09 ELEVATED GLUCOSE: ICD-10-CM

## 2024-06-04 PROBLEM — O24.419 GESTATIONAL DIABETES MELLITUS (GDM) IN THIRD TRIMESTER: Status: RESOLVED | Noted: 2021-03-29 | Resolved: 2024-06-04

## 2024-06-04 PROBLEM — D62 ACUTE BLOOD LOSS AS CAUSE OF POSTOPERATIVE ANEMIA: Status: RESOLVED | Noted: 2021-03-29 | Resolved: 2024-06-04

## 2024-06-04 LAB — EKG IMPRESSION: NORMAL

## 2024-06-04 PROCEDURE — 99214 OFFICE O/P EST MOD 30 MIN: CPT | Performed by: NURSE PRACTITIONER

## 2024-06-04 PROCEDURE — 3078F DIAST BP <80 MM HG: CPT | Performed by: NURSE PRACTITIONER

## 2024-06-04 PROCEDURE — 93005 ELECTROCARDIOGRAM TRACING: CPT | Performed by: NURSE PRACTITIONER

## 2024-06-04 PROCEDURE — 93010 ELECTROCARDIOGRAM REPORT: CPT | Performed by: INTERNAL MEDICINE

## 2024-06-04 PROCEDURE — 3074F SYST BP LT 130 MM HG: CPT | Performed by: NURSE PRACTITIONER

## 2024-06-04 PROCEDURE — 99212 OFFICE O/P EST SF 10 MIN: CPT | Performed by: NURSE PRACTITIONER

## 2024-06-04 ASSESSMENT — ENCOUNTER SYMPTOMS
NERVOUS/ANXIOUS: 0
COUGH: 0
PALPITATIONS: 0
ABDOMINAL PAIN: 0
ORTHOPNEA: 0
PND: 0
MYALGIAS: 0
FEVER: 0
CLAUDICATION: 0
SHORTNESS OF BREATH: 0

## 2024-06-04 ASSESSMENT — FIBROSIS 4 INDEX: FIB4 SCORE: 0.62

## 2024-06-04 NOTE — PROGRESS NOTES
Subjective:   Bela Gonzalez is a 48 y.o. female who presents today for follow up on tachycardia.    Hx of palpitations with inappropriate sinus tachycardia, elevated glucose, and obesity.    She presents today with her little 3 year old girl.    She has been doing well on metoprolol and has no symptoms to report. She did miss one dose and was symptomatic to palpitations with missing one dose.    She has no chest pain, shortness of breath, edema, dizziness/lightheadedness, or palpitations.    Past Medical History:   Diagnosis Date    Diabetes     gestational    Elevated WBC count     Heart burn     Inappropriate sinus node tachycardia (HCC)     Indigestion     Migraine     Pain 01/07/15    Back=chronic(scoliosis)>4/10    Scoliosis      Past Surgical History:   Procedure Laterality Date    CHOLECYSTECTOMY  2016    BETH BY LAPAROSCOPY N/A 1/15/2015    Procedure: BETH BY LAPAROSCOPY;  Surgeon: Hima Joseph M.D.;  Location: SURGERY [facility];  Service:     RECOVERY  5/23/2012    Performed by SURGERY, IR-RECOVERY at SURGERY SAME DAY [facility] ORS    OTHER      wisdom teeth     Family History   Problem Relation Age of Onset    Diabetes Mother     Asthma Sister      Social History     Tobacco Use   Smoking Status Never   Smokeless Tobacco Never     Allergies   Allergen Reactions    Azithromycin      Z-pack- per pt causes severe diarrhea        Outpatient Encounter Medications as of 6/4/2024   Medication Sig Dispense Refill    metoprolol tartrate (LOPRESSOR) 25 MG Tab Take 1 Tablet by mouth 2 times a day. 200 Tablet 3    promethazine-dextromethorphan (PROMETHAZINE-DM) 6.25-15 MG/5ML syrup Take 5 mL by mouth every four hours as needed for Cough. 120 mL 0    citalopram (CELEXA) 40 MG Tab       topiramate (TOPAMAX) 25 MG Tab 1 Tablet every day.      ibuprofen (MOTRIN) 600 MG Tab Take 1 tablet by mouth every 6 hours as needed for Moderate Pain. 30 tablet 0    loratadine (CLARITIN) 10 MG Tab Take 1 Tablet by  "mouth every day.      Esomeprazole Magnesium 20 MG Pack Take 20 mg by mouth.      Omega-3 Fatty Acids (FISH OIL PO) Take  by mouth.      CALCIUM PO Take  by mouth.      MULTIPLE VITAMIN PO Take 1 Tab by mouth every day. Indications: Prenantal      [DISCONTINUED] metoprolol tartrate (LOPRESSOR) 25 MG Tab TAKE ONE TABLET BY MOUTH TWICE A  Tablet 0     No facility-administered encounter medications on file as of 6/4/2024.     Review of Systems   Constitutional:  Negative for fever and malaise/fatigue.   Respiratory:  Negative for cough and shortness of breath.    Cardiovascular:  Negative for chest pain, palpitations, orthopnea, claudication, leg swelling and PND.   Gastrointestinal:  Negative for abdominal pain.   Musculoskeletal:  Negative for myalgias.   Psychiatric/Behavioral:  The patient is not nervous/anxious.    All other systems reviewed and are negative.       Objective:   /62 (BP Location: Left arm, Patient Position: Sitting, BP Cuff Size: Adult)   Pulse 92   Resp 16   Ht 1.6 m (5' 3\")   Wt 111 kg (244 lb 4.8 oz)   SpO2 96%   BMI 43.28 kg/m²     Physical Exam  Vitals and nursing note reviewed.   Constitutional:       General: She is not in acute distress.     Appearance: Normal appearance. She is well-developed. She is obese.      Comments:      HENT:      Head: Normocephalic and atraumatic.   Eyes:      Extraocular Movements: Extraocular movements intact.   Neck:      Vascular: No JVD.   Cardiovascular:      Rate and Rhythm: Normal rate and regular rhythm.      Pulses: Normal pulses.      Heart sounds: Normal heart sounds. No murmur heard.  Pulmonary:      Effort: Pulmonary effort is normal. No respiratory distress.      Breath sounds: Normal breath sounds.   Abdominal:      General: Bowel sounds are normal.      Palpations: Abdomen is soft.   Musculoskeletal:         General: Normal range of motion.      Cervical back: Normal range of motion.   Skin:     General: Skin is warm and dry.    "   Capillary Refill: Capillary refill takes less than 2 seconds.   Neurological:      General: No focal deficit present.      Mental Status: She is alert and oriented to person, place, and time. Mental status is at baseline.   Psychiatric:         Mood and Affect: Mood normal.         Behavior: Behavior normal.         Thought Content: Thought content normal.         Judgment: Judgment normal.       Assessment:     1. Elevated glucose        2. Inappropriate sinus tachycardia (HCC)  EKG    metoprolol tartrate (LOPRESSOR) 25 MG Tab      3. Overweight          Medical Decision Making:  Today's Assessment / Status / Plan:     1. Palpitations from inappropriate sinus tachycardia  -cont metoprolol tartrate 25 mg BID, refilled one year  -echo in '21 unremarkable  -EP consultation completed, cont bb therapy, no further recommendations    2. Overweight   -lifestyle modifications  -cont with dietary changes and start light walking program     Patient is to follow up with Bela WALLIS in 1 year with annual labs with PCP.

## 2024-10-04 ENCOUNTER — HOSPITAL ENCOUNTER (OUTPATIENT)
Dept: LAB | Facility: MEDICAL CENTER | Age: 49
End: 2024-10-04
Attending: INTERNAL MEDICINE
Payer: COMMERCIAL

## 2024-10-04 LAB
25(OH)D3 SERPL-MCNC: 33 NG/ML (ref 30–100)
ALBUMIN SERPL BCP-MCNC: 3.7 G/DL (ref 3.2–4.9)
ALBUMIN/GLOB SERPL: 1.1 G/DL
ALP SERPL-CCNC: 64 U/L (ref 30–99)
ALT SERPL-CCNC: 18 U/L (ref 2–50)
ANION GAP SERPL CALC-SCNC: 12 MMOL/L (ref 7–16)
AST SERPL-CCNC: 20 U/L (ref 12–45)
BASOPHILS # BLD AUTO: 0.5 % (ref 0–1.8)
BASOPHILS # BLD: 0.05 K/UL (ref 0–0.12)
BILIRUB SERPL-MCNC: 0.2 MG/DL (ref 0.1–1.5)
BUN SERPL-MCNC: 14 MG/DL (ref 8–22)
CALCIUM ALBUM COR SERPL-MCNC: 9.2 MG/DL (ref 8.5–10.5)
CALCIUM SERPL-MCNC: 9 MG/DL (ref 8.5–10.5)
CHLORIDE SERPL-SCNC: 106 MMOL/L (ref 96–112)
CHOLEST SERPL-MCNC: 212 MG/DL (ref 100–199)
CO2 SERPL-SCNC: 18 MMOL/L (ref 20–33)
CREAT SERPL-MCNC: 0.78 MG/DL (ref 0.5–1.4)
EOSINOPHIL # BLD AUTO: 0.22 K/UL (ref 0–0.51)
EOSINOPHIL NFR BLD: 2.2 % (ref 0–6.9)
ERYTHROCYTE [DISTWIDTH] IN BLOOD BY AUTOMATED COUNT: 46.8 FL (ref 35.9–50)
EST. AVERAGE GLUCOSE BLD GHB EST-MCNC: 126 MG/DL
ESTRADIOL SERPL-MCNC: 138 PG/ML
FERRITIN SERPL-MCNC: 60.4 NG/ML (ref 10–291)
FSH SERPL-ACNC: 1.9 MIU/ML
GFR SERPLBLD CREATININE-BSD FMLA CKD-EPI: 94 ML/MIN/1.73 M 2
GLOBULIN SER CALC-MCNC: 3.5 G/DL (ref 1.9–3.5)
GLUCOSE SERPL-MCNC: 99 MG/DL (ref 65–99)
HBA1C MFR BLD: 6 % (ref 4–5.6)
HCT VFR BLD AUTO: 45.2 % (ref 37–47)
HDLC SERPL-MCNC: 50 MG/DL
HGB BLD-MCNC: 14.4 G/DL (ref 12–16)
IMM GRANULOCYTES # BLD AUTO: 0.03 K/UL (ref 0–0.11)
IMM GRANULOCYTES NFR BLD AUTO: 0.3 % (ref 0–0.9)
IRON SATN MFR SERPL: 24 % (ref 15–55)
IRON SERPL-MCNC: 65 UG/DL (ref 40–170)
LDLC SERPL CALC-MCNC: 130 MG/DL
LH SERPL-ACNC: 3.1 IU/L
LYMPHOCYTES # BLD AUTO: 3.15 K/UL (ref 1–4.8)
LYMPHOCYTES NFR BLD: 31.6 % (ref 22–41)
MCH RBC QN AUTO: 29.3 PG (ref 27–33)
MCHC RBC AUTO-ENTMCNC: 31.9 G/DL (ref 32.2–35.5)
MCV RBC AUTO: 92.1 FL (ref 81.4–97.8)
MONOCYTES # BLD AUTO: 0.73 K/UL (ref 0–0.85)
MONOCYTES NFR BLD AUTO: 7.3 % (ref 0–13.4)
NEUTROPHILS # BLD AUTO: 5.79 K/UL (ref 1.82–7.42)
NEUTROPHILS NFR BLD: 58.1 % (ref 44–72)
NRBC # BLD AUTO: 0 K/UL
NRBC BLD-RTO: 0 /100 WBC (ref 0–0.2)
PLATELET # BLD AUTO: 327 K/UL (ref 164–446)
PMV BLD AUTO: 9.5 FL (ref 9–12.9)
POTASSIUM SERPL-SCNC: 4.1 MMOL/L (ref 3.6–5.5)
PROGEST SERPL-MCNC: 0.76 NG/ML
PROT SERPL-MCNC: 7.2 G/DL (ref 6–8.2)
RBC # BLD AUTO: 4.91 M/UL (ref 4.2–5.4)
SODIUM SERPL-SCNC: 136 MMOL/L (ref 135–145)
T3FREE SERPL-MCNC: 2.77 PG/ML (ref 2–4.4)
T4 FREE SERPL-MCNC: 0.87 NG/DL (ref 0.93–1.7)
TESTOST SERPL-MCNC: 18 NG/DL (ref 9–75)
TIBC SERPL-MCNC: 275 UG/DL (ref 250–450)
TRIGL SERPL-MCNC: 161 MG/DL (ref 0–149)
TSH SERPL-ACNC: 2.07 UIU/ML (ref 0.35–5.5)
UIBC SERPL-MCNC: 210 UG/DL (ref 110–370)
WBC # BLD AUTO: 10 K/UL (ref 4.8–10.8)

## 2024-10-04 PROCEDURE — 82670 ASSAY OF TOTAL ESTRADIOL: CPT

## 2024-10-04 PROCEDURE — 80053 COMPREHEN METABOLIC PANEL: CPT

## 2024-10-04 PROCEDURE — 86376 MICROSOMAL ANTIBODY EACH: CPT

## 2024-10-04 PROCEDURE — 84481 FREE ASSAY (FT-3): CPT

## 2024-10-04 PROCEDURE — 83550 IRON BINDING TEST: CPT

## 2024-10-04 PROCEDURE — 84144 ASSAY OF PROGESTERONE: CPT

## 2024-10-04 PROCEDURE — 82728 ASSAY OF FERRITIN: CPT

## 2024-10-04 PROCEDURE — 83036 HEMOGLOBIN GLYCOSYLATED A1C: CPT

## 2024-10-04 PROCEDURE — 86800 THYROGLOBULIN ANTIBODY: CPT

## 2024-10-04 PROCEDURE — 36415 COLL VENOUS BLD VENIPUNCTURE: CPT

## 2024-10-04 PROCEDURE — 82306 VITAMIN D 25 HYDROXY: CPT

## 2024-10-04 PROCEDURE — 83540 ASSAY OF IRON: CPT

## 2024-10-04 PROCEDURE — 84439 ASSAY OF FREE THYROXINE: CPT

## 2024-10-04 PROCEDURE — 83001 ASSAY OF GONADOTROPIN (FSH): CPT

## 2024-10-04 PROCEDURE — 83002 ASSAY OF GONADOTROPIN (LH): CPT

## 2024-10-04 PROCEDURE — 80061 LIPID PANEL: CPT

## 2024-10-04 PROCEDURE — 84403 ASSAY OF TOTAL TESTOSTERONE: CPT

## 2024-10-04 PROCEDURE — 84443 ASSAY THYROID STIM HORMONE: CPT

## 2024-10-04 PROCEDURE — 85025 COMPLETE CBC W/AUTO DIFF WBC: CPT

## 2024-10-05 LAB
THYROGLOB AB SERPL-ACNC: 1.4 IU/ML (ref 0–4)
THYROPEROXIDASE AB SERPL-ACNC: 0.3 IU/ML (ref 0–9)

## 2025-02-06 ENCOUNTER — HOSPITAL ENCOUNTER (OUTPATIENT)
Facility: MEDICAL CENTER | Age: 50
End: 2025-02-06
Attending: STUDENT IN AN ORGANIZED HEALTH CARE EDUCATION/TRAINING PROGRAM
Payer: COMMERCIAL

## 2025-02-06 ENCOUNTER — OFFICE VISIT (OUTPATIENT)
Dept: URGENT CARE | Facility: PHYSICIAN GROUP | Age: 50
End: 2025-02-06
Payer: COMMERCIAL

## 2025-02-06 VITALS
DIASTOLIC BLOOD PRESSURE: 78 MMHG | HEART RATE: 86 BPM | BODY MASS INDEX: 44.72 KG/M2 | HEIGHT: 63 IN | OXYGEN SATURATION: 94 % | WEIGHT: 252.4 LBS | SYSTOLIC BLOOD PRESSURE: 112 MMHG | RESPIRATION RATE: 16 BRPM | TEMPERATURE: 98.3 F

## 2025-02-06 DIAGNOSIS — Z20.818 EXPOSURE TO STREP THROAT: ICD-10-CM

## 2025-02-06 DIAGNOSIS — J03.90 ACUTE TONSILLITIS, UNSPECIFIED ETIOLOGY: ICD-10-CM

## 2025-02-06 LAB
AMBIGUOUS DTTM AMBI4: NORMAL
S PYO DNA SPEC NAA+PROBE: NOT DETECTED

## 2025-02-06 PROCEDURE — 99214 OFFICE O/P EST MOD 30 MIN: CPT | Performed by: STUDENT IN AN ORGANIZED HEALTH CARE EDUCATION/TRAINING PROGRAM

## 2025-02-06 PROCEDURE — 87077 CULTURE AEROBIC IDENTIFY: CPT

## 2025-02-06 PROCEDURE — 3074F SYST BP LT 130 MM HG: CPT | Performed by: STUDENT IN AN ORGANIZED HEALTH CARE EDUCATION/TRAINING PROGRAM

## 2025-02-06 PROCEDURE — 3078F DIAST BP <80 MM HG: CPT | Performed by: STUDENT IN AN ORGANIZED HEALTH CARE EDUCATION/TRAINING PROGRAM

## 2025-02-06 PROCEDURE — 87070 CULTURE OTHR SPECIMN AEROBIC: CPT

## 2025-02-06 PROCEDURE — 1126F AMNT PAIN NOTED NONE PRSNT: CPT | Performed by: STUDENT IN AN ORGANIZED HEALTH CARE EDUCATION/TRAINING PROGRAM

## 2025-02-06 PROCEDURE — 87651 STREP A DNA AMP PROBE: CPT | Performed by: STUDENT IN AN ORGANIZED HEALTH CARE EDUCATION/TRAINING PROGRAM

## 2025-02-06 ASSESSMENT — PAIN SCALES - GENERAL: PAINLEVEL_OUTOF10: NO PAIN

## 2025-02-06 ASSESSMENT — FIBROSIS 4 INDEX: FIB4 SCORE: 0.68

## 2025-02-06 NOTE — PROGRESS NOTES
"Subjective:   Bela Gonzalez is a 49 y.o. female who presents for Pharyngitis (X 3 days ), Ear Fullness (X 1 day), and Nasal Congestion (Exposure scarlet fever )      HPI:  49-year-old female presents to urgent care for 3 days of sore throat, bilateral ear fullness, nasal congestion, runny nose.  Patient's daughter just tested positive for strep throat and her symptoms started around the same time as this.  No chest pain, shortness of breath, nausea, vomiting, diarrhea, or fever.  No cough.    Medications:    CALCIUM PO  citalopram Tabs  Esomeprazole Magnesium Pack  FISH OIL PO  ibuprofen Tabs  loratadine Tabs  meloxicam  methocarbamol Tabs  metoprolol tartrate Tabs  MULTIPLE VITAMIN PO  promethazine-dextromethorphan  topiramate Tabs    Allergies: Azithromycin    Problem List: Bela Gonzalez does not have any pertinent problems on file.    Surgical History:  Past Surgical History:   Procedure Laterality Date    CHOLECYSTECTOMY  2016    BETH BY LAPAROSCOPY N/A 1/15/2015    Procedure: BETH BY LAPAROSCOPY;  Surgeon: Hima Joseph M.D.;  Location: SURGERY [facility];  Service:     RECOVERY  5/23/2012    Performed by SURGERY, IR-RECOVERY at SURGERY SAME DAY [facility] ORS    OTHER      wisdom teeth       Past Social Hx: Bela Gonzalez  reports that she has never smoked. She has never used smokeless tobacco. She reports that she does not drink alcohol and does not use drugs.     Past Family Hx:  Bela Gonzalez family history includes Asthma in her sister; Diabetes in her mother.     Problem list, medications, and allergies reviewed by myself today in Epic.     Objective:     /78 (BP Location: Right arm, Patient Position: Sitting, BP Cuff Size: Adult)   Pulse 86   Temp 36.8 °C (98.3 °F) (Temporal)   Resp 16   Ht 1.6 m (5' 3\")   Wt 114 kg (252 lb 6.4 oz)   SpO2 94%   BMI 44.71 kg/m²     Physical Exam  Vitals reviewed.   HENT:      Right Ear: Tympanic membrane, ear canal and " external ear normal.      Left Ear: Tympanic membrane, ear canal and external ear normal.      Nose: Congestion present.      Mouth/Throat:      Mouth: Mucous membranes are moist.      Pharynx: Uvula midline. Posterior oropharyngeal erythema present.      Tonsils: No tonsillar abscesses. 1+ on the right. 1+ on the left.   Cardiovascular:      Rate and Rhythm: Normal rate and regular rhythm.      Pulses: Normal pulses.      Heart sounds: Normal heart sounds. No murmur heard.  Pulmonary:      Effort: Pulmonary effort is normal. No respiratory distress.      Breath sounds: Normal breath sounds. No stridor. No wheezing, rhonchi or rales.   Lymphadenopathy:      Cervical: Cervical adenopathy present.         Lab Results/POC Test Results   Results for orders placed or performed in visit on 02/06/25   POCT GROUP A STREP, PCR    Collection Time: 02/06/25  1:49 PM   Result Value Ref Range    POC Group A Strep, PCR Not Detected Not Detected, Invalid     *Note: Due to a large number of results and/or encounters for the requested time period, some results have not been displayed. A complete set of results can be found in Results Review.           Assessment/Plan:     Diagnosis and associated orders:     1. Acute tonsillitis, unspecified etiology  POCT GROUP A STREP, PCR      2. Exposure to strep throat  POCT GROUP A STREP, PCR         Comments/MDM:     Group A strep negative.  Patient has close exposure to strep throat.  I will further evaluate with throat culture to make sure that today's test was accurate.  Patient is agreeable to the plan.  If throat culture is negative, this would further indicate that the current symptoms are due to a viral infection.  If this is viral, this should go away with additional time.         Differential diagnosis, natural history, supportive care, and indications for immediate follow-up discussed.    Advised the patient to follow-up with the primary care physician for recheck, reevaluation, and  consideration of further management.    Please note that this dictation was created using voice recognition software. I have made a reasonable attempt to correct obvious errors, but I expect that there are errors of grammar and possibly content that I did not discover before finalizing the note.    Electronically signed by Michael Waller PA-C.

## 2025-02-09 LAB
BACTERIA SPEC RESP CULT: NORMAL
SIGNIFICANT IND 70042: NORMAL
SITE SITE: NORMAL
SOURCE SOURCE: NORMAL

## 2025-03-28 ENCOUNTER — OFFICE VISIT (OUTPATIENT)
Dept: URGENT CARE | Facility: PHYSICIAN GROUP | Age: 50
End: 2025-03-28
Payer: COMMERCIAL

## 2025-03-28 ENCOUNTER — RESULTS FOLLOW-UP (OUTPATIENT)
Dept: URGENT CARE | Facility: PHYSICIAN GROUP | Age: 50
End: 2025-03-28

## 2025-03-28 VITALS
OXYGEN SATURATION: 96 % | RESPIRATION RATE: 16 BRPM | WEIGHT: 252 LBS | HEART RATE: 90 BPM | DIASTOLIC BLOOD PRESSURE: 70 MMHG | HEIGHT: 63 IN | SYSTOLIC BLOOD PRESSURE: 110 MMHG | TEMPERATURE: 98.7 F | BODY MASS INDEX: 44.65 KG/M2

## 2025-03-28 DIAGNOSIS — R30.0 DYSURIA: Primary | ICD-10-CM

## 2025-03-28 DIAGNOSIS — J02.0 PHARYNGITIS DUE TO STREPTOCOCCUS SPECIES: ICD-10-CM

## 2025-03-28 LAB
APPEARANCE UR: CLEAR
BILIRUB UR STRIP-MCNC: NORMAL MG/DL
COLOR UR AUTO: YELLOW
FLUAV RNA SPEC QL NAA+PROBE: NEGATIVE
FLUBV RNA SPEC QL NAA+PROBE: NEGATIVE
GLUCOSE UR STRIP.AUTO-MCNC: NORMAL MG/DL
KETONES UR STRIP.AUTO-MCNC: 40 MG/DL
LEUKOCYTE ESTERASE UR QL STRIP.AUTO: NORMAL
NITRITE UR QL STRIP.AUTO: NORMAL
PH UR STRIP.AUTO: 5.5 [PH] (ref 5–8)
POCT INT CON NEG: NEGATIVE
POCT INT CON POS: POSITIVE
POCT URINE PREGNANCY TEST: NEGATIVE
PROT UR QL STRIP: 100 MG/DL
RBC UR QL AUTO: NORMAL
RSV RNA SPEC QL NAA+PROBE: NEGATIVE
S PYO DNA SPEC NAA+PROBE: DETECTED
SARS-COV-2 RNA RESP QL NAA+PROBE: NEGATIVE
SP GR UR STRIP.AUTO: 1.03
UROBILINOGEN UR STRIP-MCNC: 0.2 MG/DL

## 2025-03-28 PROCEDURE — 3074F SYST BP LT 130 MM HG: CPT | Performed by: NURSE PRACTITIONER

## 2025-03-28 PROCEDURE — 99214 OFFICE O/P EST MOD 30 MIN: CPT | Performed by: NURSE PRACTITIONER

## 2025-03-28 PROCEDURE — 1125F AMNT PAIN NOTED PAIN PRSNT: CPT | Performed by: NURSE PRACTITIONER

## 2025-03-28 PROCEDURE — 3078F DIAST BP <80 MM HG: CPT | Performed by: NURSE PRACTITIONER

## 2025-03-28 PROCEDURE — 0241U POCT CEPHEID COV-2, FLU A/B, RSV - PCR: CPT | Performed by: NURSE PRACTITIONER

## 2025-03-28 PROCEDURE — 81002 URINALYSIS NONAUTO W/O SCOPE: CPT | Performed by: NURSE PRACTITIONER

## 2025-03-28 PROCEDURE — 87651 STREP A DNA AMP PROBE: CPT | Performed by: NURSE PRACTITIONER

## 2025-03-28 PROCEDURE — 81025 URINE PREGNANCY TEST: CPT | Performed by: NURSE PRACTITIONER

## 2025-03-28 RX ORDER — AMOXICILLIN 500 MG/1
500 CAPSULE ORAL 2 TIMES DAILY
Qty: 20 CAPSULE | Refills: 0 | Status: SHIPPED | OUTPATIENT
Start: 2025-03-28 | End: 2025-04-07

## 2025-03-28 RX ORDER — LIDOCAINE HYDROCHLORIDE 20 MG/ML
5 SOLUTION OROPHARYNGEAL
Qty: 150 ML | Refills: 0 | Status: SHIPPED | OUTPATIENT
Start: 2025-03-28 | End: 2025-04-02

## 2025-03-28 RX ORDER — ONDANSETRON 4 MG/1
4 TABLET, ORALLY DISINTEGRATING ORAL EVERY 6 HOURS PRN
Qty: 15 TABLET | Refills: 0 | Status: SHIPPED | OUTPATIENT
Start: 2025-03-28

## 2025-03-28 ASSESSMENT — ENCOUNTER SYMPTOMS
TROUBLE SWALLOWING: 0
CHILLS: 1
SHORTNESS OF BREATH: 1
HOARSE VOICE: 1
NAUSEA: 0

## 2025-03-28 ASSESSMENT — FIBROSIS 4 INDEX: FIB4 SCORE: 0.68

## 2025-03-28 ASSESSMENT — PAIN SCALES - GENERAL: PAINLEVEL_OUTOF10: 5=MODERATE PAIN

## 2025-03-28 NOTE — PROGRESS NOTES
Patient/parent/guardian has consented to treatment and for use of patient information for treatment and billing purposes.  Subjective:   Bela Gonzalez  is a 49 y.o. female who presents for Fever (X 3 days ), Pharyngitis, Body Aches, and Otalgia       Pharyngitis   This is a new problem. The maximum temperature recorded prior to her arrival was 102 - 102.9 F. The fever has been present for 1 to 2 days. The pain is mild. Associated symptoms include ear pain, a hoarse voice, a plugged ear sensation and shortness of breath. Pertinent negatives include no trouble swallowing. She has tried acetaminophen for the symptoms. The treatment provided mild relief.   Dysuria   This is a new problem. The current episode started yesterday. The problem has been resolved. She is Sexually active. Associated symptoms include chills and frequency. Pertinent negatives include no discharge, hematuria, nausea or possible pregnancy. She has tried acetaminophen for the symptoms. The treatment provided mild relief. There is no history of kidney stones or recurrent UTIs.       Review of Systems   Constitutional:  Positive for chills.   HENT:  Positive for ear pain and hoarse voice. Negative for trouble swallowing.    Respiratory:  Positive for shortness of breath.    Gastrointestinal:  Negative for nausea.   Genitourinary:  Positive for dysuria and frequency. Negative for hematuria.         CURRENT MEDICATIONS:  CALCIUM PO  citalopram Tabs  Esomeprazole Magnesium Pack  FISH OIL PO  ibuprofen Tabs  loratadine Tabs  meloxicam  methocarbamol Tabs  metoprolol tartrate Tabs  MULTIPLE VITAMIN PO  promethazine-dextromethorphan  topiramate Tabs  Allergies:     Allergies   Allergen Reactions    Azithromycin      Z-pack- per pt causes severe diarrhea        Current Problems: Bela Gonzalez does not have any pertinent problems on file.  Past Surgical Hx:    Past Surgical History:   Procedure Laterality Date    CHOLECYSTECTOMY  2016     "BETH BY LAPAROSCOPY N/A 1/15/2015    Procedure: BETH BY LAPAROSCOPY;  Surgeon: Hima Joseph M.D.;  Location: SURGERY [facility];  Service:     RECOVERY  5/23/2012    Performed by SURGERY, IR-RECOVERY at SURGERY SAME DAY [facility] ORS    OTHER      wisdom teeth      Past Social Hx:  reports that she has never smoked. She has never used smokeless tobacco. She reports that she does not drink alcohol and does not use drugs.    Objective:   /70 (BP Location: Right arm, Patient Position: Sitting, BP Cuff Size: Adult)   Pulse 90   Temp 37.1 °C (98.7 °F) (Temporal)   Resp 16   Ht 1.6 m (5' 3\")   Wt 114 kg (252 lb)   SpO2 96%   BMI 44.64 kg/m²   Physical Exam  Vitals and nursing note reviewed.   Constitutional:       General: She is not in acute distress.     Appearance: Normal appearance. She is normal weight. She is ill-appearing.   HENT:      Head: Normocephalic and atraumatic.      Right Ear: External ear normal. No swelling or tenderness. A middle ear effusion is present. Tympanic membrane is bulging. Tympanic membrane is not erythematous.      Left Ear: External ear normal. Tenderness present. No swelling. A middle ear effusion is present. Tympanic membrane is bulging. Tympanic membrane is not erythematous.      Nose: Mucosal edema present. No congestion or rhinorrhea.      Right Sinus: No maxillary sinus tenderness or frontal sinus tenderness.      Left Sinus: No maxillary sinus tenderness or frontal sinus tenderness.      Mouth/Throat:      Mouth: Mucous membranes are moist.      Pharynx: Pharyngeal swelling, posterior oropharyngeal erythema and uvula swelling present. No oropharyngeal exudate.      Tonsils: No tonsillar exudate. 3+ on the right. 3+ on the left.   Eyes:      Extraocular Movements: Extraocular movements intact.      Conjunctiva/sclera: Conjunctivae normal.      Pupils: Pupils are equal, round, and reactive to light.   Cardiovascular:      Rate and Rhythm: Normal rate and regular " rhythm.      Pulses: Normal pulses.      Heart sounds: Normal heart sounds, S1 normal and S2 normal.   Pulmonary:      Effort: Pulmonary effort is normal.      Breath sounds: Normal breath sounds. No wheezing, rhonchi or rales.   Abdominal:      Palpations: Abdomen is soft.      Tenderness: There is no abdominal tenderness.   Musculoskeletal:         General: Normal range of motion.      Cervical back: Normal range of motion and neck supple.   Skin:     General: Skin is warm and dry.      Findings: No rash.   Neurological:      General: No focal deficit present.      Mental Status: She is alert and oriented to person, place, and time.   Psychiatric:         Mood and Affect: Mood normal.         Behavior: Behavior normal.       Results for orders placed or performed in visit on 03/28/25   POCT Urinalysis    Collection Time: 03/28/25 10:49 AM   Result Value Ref Range    POC Color yellow Negative    POC Appearance clear Negative    POC Glucose neg Negative mg/dL    POC Bilirubin mod Negative mg/dL    POC Ketones 40 Negative mg/dL    POC Specific Gravity 1.030 <1.005 - >1.030    POC Blood neg Negative    POC Urine PH 5.5 5.0 - 8.0    POC Protein 100 Negative mg/dL    POC Urobiligen 0.2 Negative (0.2) mg/dL    POC Nitrites neg Negative    POC Leukocyte Esterase neg Negative   POCT CEPHEID GROUP A STREP - PCR    Collection Time: 03/28/25 10:54 AM   Result Value Ref Range    POC Group A Strep, PCR Detected (A) Not Detected, Invalid   POCT CEPHEID COV-2, FLU A/B, RSV - PCR    Collection Time: 03/28/25 11:29 AM   Result Value Ref Range    SARS-CoV-2 by PCR Negative Negative, Invalid    Influenza virus A RNA Negative Negative, Invalid    Influenza virus B, PCR Negative Negative, Invalid    RSV, PCR Negative Negative, Invalid   POCT PREGNANCY    Collection Time: 03/28/25 11:29 AM   Result Value Ref Range    POC Urine Pregnancy Test Negative     Internal Control Positive Positive     Internal Control Negative Negative           Assessment/Plan:   1. Pharyngitis due to Streptococcus species  - POCT CEPHEID COV-2, FLU A/B, RSV - PCR  - POCT CEPHEID GROUP A STREP - PCR  - lidocaine (XYLOCAINE) 2 % Solution; Take 5 mL by mouth 6 Times a Day for 5 days. May swish and swallow, or gargle as needed.  Dispense: 150 mL; Refill: 0  - ondansetron (ZOFRAN ODT) 4 MG TABLET DISPERSIBLE; Take 1 Tablet by mouth every 6 hours as needed for Nausea/Vomiting for up to 15 doses.  Dispense: 15 Tablet; Refill: 0  - amoxicillin (AMOXIL) 500 MG Cap; Take 1 Capsule by mouth 2 times a day for 10 days.  Dispense: 20 Capsule; Refill: 0    2. Dysuria  - POCT Urinalysis  - POCT PREGNANCY    49-year-old female presents with concern for fever, sore throat, and transient dysuria yesterday.  Vital signs stable, afebrile.  In no acute distress, does appear mildly ill.  Point-of-care UA negative for infection, does demonstrate some dehydration.  POC pregnancy negative.  Point-of-care viral and strep testing completed. Will contact patient with any positive results.  Reassurance with anticipatory guidance provided regarding length of time and expected symptoms discussed. Symptom management medication sent to pharmacy with instructions for use.  Recommend adequate hydration, rest, deep breathing and coughing, ambulation as tolerated, OTC medications. Delsym OTC cough medication lasts 12 hours, might help you sleep better.  Consider Ponaris, or Nose Better nasal emollients, and saline nasal spray for nasal congestion or to prevent nasal passage dryness or allergies.   If you have a history of hypertension recommend Coricidin to prevent blood pressure elevation. Be sure to change your toothbrush!    Red flags, RTC and ER precautions discussed, with patient confirming their understanding of  need for immediate follow-up.  Discussed medication management options, risks and benefits, and alternatives to treatment plan agreed upon. Instructed to continue  medications without  changes as ordered by primary care unless aforementioned above.  Patient expresses understanding and agrees to plan of care. All questions or concerns answered. For my MDM, I have personally reviewed previous notes, and test results as pertinent to today's visit.  11:43 AM  Point-of-care strep positive, amoxicillin sent to pharmacy with instructions for use.  Called patient to review results together.  All other point-of-care negative.  Please note that this dictation was created using voice recognition software. I have made every reasonable attempt to correct obvious errors,  but there may be grammar errors, and possibly content that I did not discover before finalizing the note.   This note was electronically signed by CLARITA Romano

## 2025-04-14 ENCOUNTER — RESULTS FOLLOW-UP (OUTPATIENT)
Dept: URGENT CARE | Facility: PHYSICIAN GROUP | Age: 50
End: 2025-04-14

## 2025-04-14 ENCOUNTER — OFFICE VISIT (OUTPATIENT)
Dept: URGENT CARE | Facility: PHYSICIAN GROUP | Age: 50
End: 2025-04-14
Payer: COMMERCIAL

## 2025-04-14 VITALS
RESPIRATION RATE: 16 BRPM | DIASTOLIC BLOOD PRESSURE: 70 MMHG | WEIGHT: 254 LBS | SYSTOLIC BLOOD PRESSURE: 114 MMHG | OXYGEN SATURATION: 96 % | TEMPERATURE: 97.8 F | HEIGHT: 63 IN | BODY MASS INDEX: 45 KG/M2 | HEART RATE: 83 BPM

## 2025-04-14 DIAGNOSIS — J02.0 ACUTE STREPTOCOCCAL PHARYNGITIS: ICD-10-CM

## 2025-04-14 DIAGNOSIS — J02.9 PHARYNGITIS, UNSPECIFIED ETIOLOGY: ICD-10-CM

## 2025-04-14 LAB — S PYO DNA SPEC NAA+PROBE: DETECTED

## 2025-04-14 PROCEDURE — 87651 STREP A DNA AMP PROBE: CPT | Performed by: STUDENT IN AN ORGANIZED HEALTH CARE EDUCATION/TRAINING PROGRAM

## 2025-04-14 PROCEDURE — 3078F DIAST BP <80 MM HG: CPT | Performed by: STUDENT IN AN ORGANIZED HEALTH CARE EDUCATION/TRAINING PROGRAM

## 2025-04-14 PROCEDURE — 99213 OFFICE O/P EST LOW 20 MIN: CPT | Performed by: STUDENT IN AN ORGANIZED HEALTH CARE EDUCATION/TRAINING PROGRAM

## 2025-04-14 PROCEDURE — 3074F SYST BP LT 130 MM HG: CPT | Performed by: STUDENT IN AN ORGANIZED HEALTH CARE EDUCATION/TRAINING PROGRAM

## 2025-04-14 RX ORDER — CEPHALEXIN 500 MG/1
500 CAPSULE ORAL 2 TIMES DAILY
Qty: 20 CAPSULE | Refills: 0 | Status: SHIPPED | OUTPATIENT
Start: 2025-04-14 | End: 2025-04-24

## 2025-04-14 ASSESSMENT — ENCOUNTER SYMPTOMS
SORE THROAT: 1
FEVER: 0
COUGH: 0
CHILLS: 0

## 2025-04-14 ASSESSMENT — FIBROSIS 4 INDEX: FIB4 SCORE: 0.68

## 2025-04-14 NOTE — PROGRESS NOTES
Subjective:   Bela Gonzalez is a 49 y.o. female who presents for Sore Throat (Ear pressure, congestion, couple of days )      HPI:  49-year-old female presents with sore throat congestion and swelling in the back of her mouth.  She reports significant pain with swallowing.  She was recently completed a course of antibiotics for strep pharyngitis that was diagnosed around 3 weeks ago.  She reports full resolution of symptoms for about 10 days prior to this starting up again.  She reports it feels exactly like it did last time.  She is concerned she has strep again.    Review of Systems   Constitutional:  Negative for chills and fever.   HENT:  Positive for congestion and sore throat.    Respiratory:  Negative for cough.        Medications:    CALCIUM PO  citalopram Tabs  Esomeprazole Magnesium Pack  FISH OIL PO  ibuprofen Tabs  loratadine Tabs  meloxicam  methocarbamol Tabs  metoprolol tartrate Tabs  MULTIPLE VITAMIN PO  ondansetron Tbdp  promethazine-dextromethorphan  topiramate Tabs    Allergies: Azithromycin    Problem List: Bela Gonzalez does not have any pertinent problems on file.    Surgical History:  Past Surgical History:   Procedure Laterality Date    CHOLECYSTECTOMY  2016    BETH BY LAPAROSCOPY N/A 1/15/2015    Procedure: BETH BY LAPAROSCOPY;  Surgeon: Hima Joseph M.D.;  Location: SURGERY [facility];  Service:     RECOVERY  5/23/2012    Performed by SURGERY, IR-RECOVERY at SURGERY SAME DAY [facility]    OTHER      wisdom teeth       Past Social Hx: Bela Gonzalez  reports that she has never smoked. She has never used smokeless tobacco. She reports that she does not drink alcohol and does not use drugs.     Past Family Hx:  Bela Gonzalez family history includes Asthma in her sister; Diabetes in her mother.     Problem list, medications, and allergies reviewed by myself today in Epic.     Objective:     /70 (BP Location: Left arm, Patient Position:  "Sitting, BP Cuff Size: Adult)   Pulse 83   Temp 36.6 °C (97.8 °F) (Temporal)   Resp 16   Ht 1.6 m (5' 3\")   Wt 115 kg (254 lb)   SpO2 96%   BMI 44.99 kg/m²     Physical Exam  Constitutional:       Appearance: Normal appearance.   HENT:      Head: Normocephalic and atraumatic.      Right Ear: Tympanic membrane normal.      Left Ear: Tympanic membrane normal.      Nose: Nose normal. No congestion or rhinorrhea.      Mouth/Throat:      Mouth: Mucous membranes are moist.      Pharynx: Oropharyngeal exudate and posterior oropharyngeal erythema present.   Eyes:      Extraocular Movements: Extraocular movements intact.      Conjunctiva/sclera: Conjunctivae normal.   Cardiovascular:      Rate and Rhythm: Normal rate and regular rhythm.      Pulses: Normal pulses.      Heart sounds: Normal heart sounds.   Pulmonary:      Effort: Pulmonary effort is normal.      Breath sounds: Normal breath sounds.   Neurological:      Mental Status: She is alert.         Assessment/Plan:     Diagnosis and associated orders:     1. Pharyngitis, unspecified etiology  POCT GROUP A STREP, PCR      2. Acute streptococcal pharyngitis  cephALEXin (KEFLEX) 500 MG Cap         Comments/MDM:     1. Pharyngitis, unspecified etiology  2. Acute streptococcal pharyngitis  Supportive care to manage pharyngitis discussed with patient  Salt water gargles BID and prn. Suggested 1/4 to 1/2 teaspoon (1.5 to 3.0 g) of salt per one cup (8 ounces or 250 mL) of warm water.   Use of honey and warm water and/or tea helps alleviate feelings of discomfort and pain as well.  OTC throat analgesic spray or lozenge of choice prn throat pain. Dosage and directions per   OTC  analgesic of choice (acetaminophen or NSAID) prn pain. Follow manufactures dosing and safety precautions.     Return precautions discussed including significant worsening of current symptoms no improvement with supportive care or treatment, difficulty breathing or difficulty " swallowing.        Due to return of strep pharyngitis after course of amoxicillin will change coverage to Keflex as below.  - POCT GROUP A STREP, PCR      - cephALEXin (KEFLEX) 500 MG Cap; Take 1 Capsule by mouth 2 times a day for 10 days.  Dispense: 20 Capsule; Refill: 0           Differential diagnosis, natural history, supportive care, and indications for immediate follow-up discussed.    Advised the patient to follow-up with the primary care physician for recheck, reevaluation, and consideration of further management.    Please note that this dictation was created using voice recognition software. I have made a reasonable attempt to correct obvious errors, but I expect that there are errors of grammar and possibly content that I did not discover before finalizing the note.    Ramon Mauricio M.D.

## 2025-04-30 ENCOUNTER — OFFICE VISIT (OUTPATIENT)
Dept: URGENT CARE | Facility: PHYSICIAN GROUP | Age: 50
End: 2025-04-30
Payer: COMMERCIAL

## 2025-04-30 ENCOUNTER — RESULTS FOLLOW-UP (OUTPATIENT)
Dept: URGENT CARE | Facility: PHYSICIAN GROUP | Age: 50
End: 2025-04-30

## 2025-04-30 VITALS
TEMPERATURE: 98.9 F | SYSTOLIC BLOOD PRESSURE: 100 MMHG | WEIGHT: 253 LBS | RESPIRATION RATE: 16 BRPM | BODY MASS INDEX: 44.83 KG/M2 | HEART RATE: 104 BPM | HEIGHT: 63 IN | OXYGEN SATURATION: 98 % | DIASTOLIC BLOOD PRESSURE: 60 MMHG

## 2025-04-30 DIAGNOSIS — J02.0 PHARYNGITIS DUE TO STREPTOCOCCUS SPECIES: ICD-10-CM

## 2025-04-30 LAB
HETEROPH AB SER QL LA: NEGATIVE
POCT INT CON NEG: NEGATIVE
POCT INT CON POS: POSITIVE
S PYO DNA SPEC NAA+PROBE: DETECTED

## 2025-04-30 RX ORDER — PENICILLIN V POTASSIUM 500 MG/1
500 TABLET, FILM COATED ORAL 2 TIMES DAILY
Qty: 20 TABLET | Refills: 0 | Status: SHIPPED | OUTPATIENT
Start: 2025-04-30 | End: 2025-05-10

## 2025-04-30 ASSESSMENT — FIBROSIS 4 INDEX: FIB4 SCORE: 0.68

## 2025-04-30 ASSESSMENT — PAIN SCALES - GENERAL: PAINLEVEL_OUTOF10: 4=SLIGHT-MODERATE PAIN

## 2025-05-29 DIAGNOSIS — I47.11 INAPPROPRIATE SINUS TACHYCARDIA (HCC): ICD-10-CM

## 2025-05-30 RX ORDER — METOPROLOL TARTRATE 25 MG/1
25 TABLET, FILM COATED ORAL 2 TIMES DAILY
Qty: 180 TABLET | Refills: 0 | Status: SHIPPED | OUTPATIENT
Start: 2025-05-30

## 2025-05-30 NOTE — TELEPHONE ENCOUNTER
Last OV 6/4/24. No FV noted.     Schedulers: Please contact pt to schedule with [facility] for annual OV. Thank you!

## 2025-05-30 NOTE — TELEPHONE ENCOUNTER
Is the patient due for a refill? Yes    Was the patient seen the last 15 months? Yes    Date of last office visit: 6/4/24    Does the patient have an upcoming appointment?  No      Provider to refill:[facility]     Does the patient have intermediate Plus and need 100-day supply? (This applies to ALL medications) Patient does not have SCP

## 2025-07-24 ENCOUNTER — OFFICE VISIT (OUTPATIENT)
Dept: CARDIOLOGY | Facility: MEDICAL CENTER | Age: 50
End: 2025-07-24
Attending: NURSE PRACTITIONER
Payer: COMMERCIAL

## 2025-07-24 VITALS
OXYGEN SATURATION: 98 % | SYSTOLIC BLOOD PRESSURE: 106 MMHG | BODY MASS INDEX: 45.71 KG/M2 | DIASTOLIC BLOOD PRESSURE: 70 MMHG | HEIGHT: 63 IN | HEART RATE: 84 BPM | WEIGHT: 258 LBS

## 2025-07-24 DIAGNOSIS — I47.11 INAPPROPRIATE SINUS TACHYCARDIA (HCC): Primary | ICD-10-CM

## 2025-07-24 DIAGNOSIS — E66.3 OVERWEIGHT: ICD-10-CM

## 2025-07-24 PROCEDURE — 99212 OFFICE O/P EST SF 10 MIN: CPT | Performed by: NURSE PRACTITIONER

## 2025-07-24 RX ORDER — METOPROLOL TARTRATE 25 MG/1
25 TABLET, FILM COATED ORAL 2 TIMES DAILY
Qty: 180 TABLET | Refills: 3 | Status: SHIPPED | OUTPATIENT
Start: 2025-07-24

## 2025-07-24 RX ORDER — LEVOTHYROXINE SODIUM 50 UG/1
TABLET ORAL
COMMUNITY
Start: 2025-07-07

## 2025-07-24 ASSESSMENT — ENCOUNTER SYMPTOMS
COUGH: 0
FEVER: 0
CLAUDICATION: 0
SHORTNESS OF BREATH: 0
ABDOMINAL PAIN: 0
TINGLING: 1
ORTHOPNEA: 0
NERVOUS/ANXIOUS: 0
MYALGIAS: 0
PALPITATIONS: 0
PND: 0

## 2025-07-24 ASSESSMENT — FIBROSIS 4 INDEX: FIB4 SCORE: 0.68

## 2025-07-24 NOTE — PROGRESS NOTES
Subjective:   Bela Gonzalez is a 49 y.o. female who presents today for follow up on tachycardia.    Hx of palpitations with inappropriate sinus tachycardia, elevated glucose, and obesity.    She presents today with her spouse.    She has been doing well on metoprolol and has no symptoms to report.     Some neuropathy in hands when you wake up in the morning.    She has no chest pain, shortness of breath, edema, dizziness/lightheadedness, or palpitations.    Past Medical History:   Diagnosis Date    Diabetes     gestational    Elevated WBC count     Heart burn     Inappropriate sinus node tachycardia (HCC)     Indigestion     Migraine     Pain 01/07/15    Back=chronic(scoliosis)>4/10    Scoliosis      Past Surgical History:   Procedure Laterality Date    CHOLECYSTECTOMY  2016    BETH BY LAPAROSCOPY N/A 1/15/2015    Procedure: BETH BY LAPAROSCOPY;  Surgeon: Hima Joseph M.D.;  Location: SURGERY [facility];  Service:     RECOVERY  5/23/2012    Performed by SURGERY, IR-RECOVERY at SURGERY SAME DAY [facility] ORS    OTHER      wisdom teeth     Family History   Problem Relation Age of Onset    Diabetes Mother     Asthma Sister      Social History     Tobacco Use   Smoking Status Never   Smokeless Tobacco Never     Allergies   Allergen Reactions    Azithromycin      Z-pack- per pt causes severe diarrhea        Outpatient Encounter Medications as of 7/24/2025   Medication Sig Dispense Refill    levothyroxine (SYNTHROID) 50 MCG Tab       metoprolol tartrate (LOPRESSOR) 25 MG Tab Take 1 Tablet by mouth 2 times a day. 180 Tablet 3    methocarbamol (ROBAXIN) 750 MG Tab Take 1 Tablet by mouth 3 times a day as needed (muscle spasm). 90 Tablet 2    citalopram (CELEXA) 40 MG Tab       topiramate (TOPAMAX) 25 MG Tab 1 Tablet every day.      ibuprofen (MOTRIN) 600 MG Tab Take 1 tablet by mouth every 6 hours as needed for Moderate Pain. 30 tablet 0    loratadine (CLARITIN) 10 MG Tab Take 1 Tablet by mouth every day.    "   Esomeprazole Magnesium 20 MG Pack Take 20 mg by mouth.      Omega-3 Fatty Acids (FISH OIL PO) Take  by mouth.      CALCIUM PO Take  by mouth.      MULTIPLE VITAMIN PO Take 1 Tab by mouth every day. Indications: Prenantal      [DISCONTINUED] metoprolol tartrate (LOPRESSOR) 25 MG Tab TAKE 1 TABLET BY MOUTH 2 TIMES A  Tablet 0    [DISCONTINUED] ondansetron (ZOFRAN ODT) 4 MG TABLET DISPERSIBLE Take 1 Tablet by mouth every 6 hours as needed for Nausea/Vomiting for up to 15 doses. (Patient not taking: Reported on 7/24/2025) 15 Tablet 0    [DISCONTINUED] meloxicam (MOBIC) 15 MG tablet Take one tablet by mouth with food daily. No advil/aleve/motrin/ibuprofen on same day. (Patient not taking: Reported on 7/24/2025) 30 Tablet 2    [DISCONTINUED] promethazine-dextromethorphan (PROMETHAZINE-DM) 6.25-15 MG/5ML syrup Take 5 mL by mouth every four hours as needed for Cough. (Patient not taking: Reported on 4/30/2025) 120 mL 0     No facility-administered encounter medications on file as of 7/24/2025.     Review of Systems   Constitutional:  Negative for fever and malaise/fatigue.   Respiratory:  Negative for cough and shortness of breath.    Cardiovascular:  Negative for chest pain, palpitations, orthopnea, claudication, leg swelling and PND.   Gastrointestinal:  Negative for abdominal pain.   Musculoskeletal:  Negative for myalgias.   Neurological:  Positive for tingling.   Psychiatric/Behavioral:  The patient is not nervous/anxious.    All other systems reviewed and are negative.       Objective:   /70 (BP Location: Left arm, Patient Position: Sitting, BP Cuff Size: Adult)   Pulse 84   Ht 1.6 m (5' 3\")   Wt 117 kg (258 lb)   SpO2 98%   BMI 45.70 kg/m²     Physical Exam  Vitals and nursing note reviewed.   Constitutional:       General: She is not in acute distress.     Appearance: Normal appearance. She is well-developed. She is obese.      Comments:      HENT:      Head: Normocephalic and atraumatic. "   Eyes:      Extraocular Movements: Extraocular movements intact.   Neck:      Vascular: No JVD.   Cardiovascular:      Rate and Rhythm: Normal rate and regular rhythm.      Pulses: Normal pulses.      Heart sounds: Normal heart sounds. No murmur heard.  Pulmonary:      Effort: Pulmonary effort is normal. No respiratory distress.      Breath sounds: Normal breath sounds.   Abdominal:      General: Bowel sounds are normal.      Palpations: Abdomen is soft.   Musculoskeletal:         General: Normal range of motion.      Cervical back: Normal range of motion.   Skin:     General: Skin is warm and dry.      Capillary Refill: Capillary refill takes less than 2 seconds.   Neurological:      General: No focal deficit present.      Mental Status: She is alert and oriented to person, place, and time. Mental status is at baseline.   Psychiatric:         Mood and Affect: Mood normal.         Behavior: Behavior normal.         Thought Content: Thought content normal.         Judgment: Judgment normal.       Assessment:     1. Inappropriate sinus tachycardia (HCC)  metoprolol tartrate (LOPRESSOR) 25 MG Tab      2. Overweight          Medical Decision Making:  Today's Assessment / Status / Plan:     1. Palpitations from inappropriate sinus tachycardia  -cont metoprolol tartrate 25 mg BID, refilled one year  -echo in '21 unremarkable  -EP consultation completed, cont bb therapy, no further recommendations    2. Overweight with hyperglycemia  -lifestyle modifications  -cont with dietary changes and start light walking program   -OK for weight loss drugs with progressive increase in blood sugars, she will discuss this with her PCP as well cholesterol management    Patient is to follow up with Bela WALLIS in 1 year with annual labs with PCP.